# Patient Record
Sex: MALE | Race: WHITE | URBAN - METROPOLITAN AREA
[De-identification: names, ages, dates, MRNs, and addresses within clinical notes are randomized per-mention and may not be internally consistent; named-entity substitution may affect disease eponyms.]

---

## 2018-01-29 RX ORDER — PREDNISONE 10 MG/1
TABLET ORAL
COMMUNITY
Start: 2014-11-20 | End: 2018-01-30 | Stop reason: SDUPTHER

## 2018-01-29 RX ORDER — LORAZEPAM 0.5 MG/1
1 TABLET ORAL EVERY 12 HOURS PRN
COMMUNITY
Start: 2014-11-20 | End: 2018-01-30 | Stop reason: SDUPTHER

## 2018-01-29 RX ORDER — NAPROXEN AND ESOMEPRAZOLE MAGNESIUM 500; 20 MG/1; MG/1
1 TABLET, DELAYED RELEASE ORAL EVERY 12 HOURS
COMMUNITY
Start: 2014-11-20 | End: 2018-01-30 | Stop reason: ALTCHOICE

## 2018-01-29 RX ORDER — CYCLOBENZAPRINE HYDROCHLORIDE 15 MG/1
CAPSULE, EXTENDED RELEASE ORAL
COMMUNITY
Start: 2014-11-20 | End: 2018-01-30 | Stop reason: ALTCHOICE

## 2018-01-30 ENCOUNTER — OFFICE VISIT (OUTPATIENT)
Dept: FAMILY MEDICINE CLINIC | Facility: CLINIC | Age: 28
End: 2018-01-30
Payer: COMMERCIAL

## 2018-01-30 ENCOUNTER — DOCUMENTATION (OUTPATIENT)
Dept: FAMILY MEDICINE CLINIC | Facility: CLINIC | Age: 28
End: 2018-01-30

## 2018-01-30 VITALS
HEIGHT: 70 IN | DIASTOLIC BLOOD PRESSURE: 62 MMHG | BODY MASS INDEX: 40.52 KG/M2 | WEIGHT: 283 LBS | SYSTOLIC BLOOD PRESSURE: 144 MMHG

## 2018-01-30 DIAGNOSIS — F31.9 BIPOLAR 1 DISORDER (HCC): Primary | ICD-10-CM

## 2018-01-30 PROCEDURE — 99213 OFFICE O/P EST LOW 20 MIN: CPT | Performed by: FAMILY MEDICINE

## 2018-01-30 RX ORDER — LORAZEPAM 0.5 MG/1
0.5 TABLET ORAL
Qty: 30 TABLET | Refills: 0 | Status: SHIPPED | OUTPATIENT
Start: 2018-01-30 | End: 2018-02-27 | Stop reason: SDUPTHER

## 2018-01-30 RX ORDER — LAMOTRIGINE 25 MG/1
25 TABLET ORAL DAILY
Qty: 30 TABLET | Refills: 1 | Status: SHIPPED | OUTPATIENT
Start: 2018-01-30 | End: 2018-02-27 | Stop reason: SDUPTHER

## 2018-01-30 NOTE — PROGRESS NOTES
Assessment/Plan:    No problem-specific Assessment & Plan notes found for this encounter  Diagnoses and all orders for this visit:    Bipolar 1 disorder ([facility] ([place] [address] )  -     CBC and differential; Future  -     Comprehensive metabolic panel; Future  -     TSH, 3rd generation with T4 reflex; Future  -     Lipid panel; Future  -     lamoTRIgine (LaMICtal) 25 mg tablet; Take 1 tablet (25 mg total) by mouth daily  -     LORazepam (ATIVAN) 0 5 mg tablet; Take 1 tablet (0 5 mg total) by mouth daily at bedtime    Other orders  -     Discontinue: cyclobenzaprine (AMRIX) 15 MG 24 hr capsule; Take by mouth  -     Discontinue: LORazepam (ATIVAN) 0 5 mg tablet; Take 1 tablet by mouth every 12 (twelve) hours as needed  -     Discontinue: predniSONE 10 mg tablet; Take by mouth  -     Discontinue: Naproxen-Esomeprazole (VIMOVO) 500-20 MG TBEC; Take 1 tablet by mouth every 12 (twelve) hours          Subjective:      Patient ID: Shante Burgess is a 28 y o  male  Patient is here for anxiety  Patient does have anxiety daily  Patient also feeling depressed intermittently  Patient has had some suicidal ideation in the past no plan  The patient without homicidal or suicidal ideation presently  The patient with irritability  Patient is a nonsmoker  Patient has used marijuana in the past   No alcohol use  Patient with decreased focus due to this  Patient worries about multiple things  Patient with some insomnia  Patient will buy things that he can afford  Patient also on occasion go to bars after work which he does not normally do  Patient will then have depressive episodes  She sister with psychiatric issues  Patient has never been hospitalized for psychiatric issues  Anxiety   Symptoms include decreased concentration and nervous/anxious behavior  Patient reports no confusion or suicidal ideas             The following portions of the patient's history were reviewed and updated as appropriate: allergies, current medications, past family history, past medical history, past social history, past surgical history and problem list     Review of Systems   Constitutional: Negative  HENT: Negative  Eyes: Negative  Respiratory: Negative  Cardiovascular: Negative  Gastrointestinal: Negative  Endocrine: Negative  Genitourinary: Negative  Musculoskeletal: Negative  Skin: Negative  Allergic/Immunologic: Negative  Neurological: Negative  Hematological: Negative  Psychiatric/Behavioral: Positive for agitation, behavioral problems, decreased concentration and sleep disturbance  Negative for confusion, dysphoric mood, hallucinations, self-injury and suicidal ideas  The patient is nervous/anxious  The patient is not hyperactive  Objective:     Physical Exam   Constitutional: He is oriented to person, place, and time  He appears well-developed and well-nourished  No distress  HENT:   Head: Normocephalic  Right Ear: External ear normal    Left Ear: External ear normal    Mouth/Throat: Oropharynx is clear and moist  No oropharyngeal exudate  Eyes: EOM are normal  Pupils are equal, round, and reactive to light  Right eye exhibits no discharge  Left eye exhibits no discharge  No scleral icterus  Neck: Normal range of motion  Neck supple  No thyromegaly present  Cardiovascular: Normal rate, regular rhythm, normal heart sounds and intact distal pulses  Exam reveals no gallop and no friction rub  No murmur heard  Pulmonary/Chest: Effort normal and breath sounds normal  No respiratory distress  He has no wheezes  He has no rales  He exhibits no tenderness  Abdominal: Soft  Bowel sounds are normal  He exhibits no distension  There is no tenderness  There is no rebound and no guarding  Musculoskeletal: Normal range of motion  He exhibits deformity  He exhibits no edema or tenderness  Lymphadenopathy:     He has no cervical adenopathy     Neurological: He is oriented to person, place, and time  No cranial nerve deficit  He exhibits normal muscle tone  Coordination normal    Skin: Skin is warm and dry  No rash noted  He is not diaphoretic  No erythema  No pallor  Psychiatric: He has a normal mood and affect   His behavior is normal  Judgment and thought content normal

## 2018-02-27 ENCOUNTER — OFFICE VISIT (OUTPATIENT)
Dept: FAMILY MEDICINE CLINIC | Facility: CLINIC | Age: 28
End: 2018-02-27
Payer: COMMERCIAL

## 2018-02-27 VITALS
HEIGHT: 60 IN | DIASTOLIC BLOOD PRESSURE: 70 MMHG | BODY MASS INDEX: 53.95 KG/M2 | WEIGHT: 274.8 LBS | SYSTOLIC BLOOD PRESSURE: 130 MMHG

## 2018-02-27 DIAGNOSIS — F31.9 BIPOLAR 1 DISORDER (HCC): Primary | ICD-10-CM

## 2018-02-27 PROCEDURE — 99213 OFFICE O/P EST LOW 20 MIN: CPT | Performed by: FAMILY MEDICINE

## 2018-02-27 RX ORDER — LAMOTRIGINE 25 MG/1
25 TABLET ORAL DAILY
Qty: 30 TABLET | Refills: 3 | Status: SHIPPED | OUTPATIENT
Start: 2018-02-27 | End: 2018-08-08 | Stop reason: SDUPTHER

## 2018-02-27 RX ORDER — ESCITALOPRAM OXALATE 5 MG/1
5 TABLET ORAL DAILY
Qty: 30 TABLET | Refills: 3 | Status: SHIPPED | OUTPATIENT
Start: 2018-02-27 | End: 2018-04-10 | Stop reason: SDUPTHER

## 2018-02-27 RX ORDER — LORAZEPAM 0.5 MG/1
0.5 TABLET ORAL
Qty: 30 TABLET | Refills: 1 | Status: SHIPPED | OUTPATIENT
Start: 2018-02-27 | End: 2018-04-10 | Stop reason: DRUGHIGH

## 2018-02-27 NOTE — PROGRESS NOTES
Assessment/Plan:    No problem-specific Assessment & Plan notes found for this encounter  Diagnoses and all orders for this visit:    Bipolar 1 disorder ([address] )  -     escitalopram (LEXAPRO) 5 mg tablet; Take 1 tablet (5 mg total) by mouth daily  -     lamoTRIgine (LaMICtal) 25 mg tablet; Take 1 tablet (25 mg total) by mouth daily  -     LORazepam (ATIVAN) 0 5 mg tablet; Take 1 tablet (0 5 mg total) by mouth daily at bedtime          Subjective:      Patient ID: Peyton Webber is a 28 y o  male  Patient follow-up on bipolar disorder  Patient not having manic episodes or severe depression  Patient able to function better at work  Patient making better choices regarding diet  Patient is feeling better overall patient is using Xanax once daily in the morning  The following portions of the patient's history were reviewed and updated as appropriate: allergies, current medications, past family history, past medical history, past social history, past surgical history and problem list     Review of Systems   Constitutional: Negative  HENT: Negative  Eyes: Negative  Respiratory: Negative  Cardiovascular: Negative  Gastrointestinal: Negative  Endocrine: Negative  Genitourinary: Negative  Musculoskeletal: Negative  Skin: Negative  Allergic/Immunologic: Negative  Neurological: Negative  Hematological: Negative  Psychiatric/Behavioral: Negative  Objective:      /70 (BP Location: Left arm, Patient Position: Sitting, Cuff Size: Standard)   Ht 1' 9 34" (0 542 m)   Wt 125 kg (274 lb 12 8 oz)    42 kg/m²          Physical Exam   Constitutional: He is oriented to person, place, and time  He appears well-developed and well-nourished  No distress  HENT:   Head: Normocephalic  Right Ear: External ear normal    Left Ear: External ear normal    Mouth/Throat: Oropharynx is clear and moist  No oropharyngeal exudate     Eyes: EOM are normal  Pupils are equal, round, and reactive to light  Right eye exhibits no discharge  Left eye exhibits no discharge  No scleral icterus  Neck: Normal range of motion  Neck supple  No thyromegaly present  Cardiovascular: Normal rate, regular rhythm, normal heart sounds and intact distal pulses  Exam reveals no gallop and no friction rub  No murmur heard  Pulmonary/Chest: Effort normal and breath sounds normal  No respiratory distress  He has no wheezes  He has no rales  He exhibits no tenderness  Abdominal: Soft  Bowel sounds are normal  He exhibits no distension  There is no tenderness  There is no rebound and no guarding  Musculoskeletal: Normal range of motion  He exhibits no edema or tenderness  Lymphadenopathy:     He has no cervical adenopathy  Neurological: He is oriented to person, place, and time  No cranial nerve deficit  He exhibits normal muscle tone  Coordination normal    Skin: Skin is warm and dry  No rash noted  He is not diaphoretic  No erythema  No pallor  Psychiatric: He has a normal mood and affect  His behavior is normal  Judgment and thought content normal    Nursing note and vitals reviewed

## 2018-04-10 ENCOUNTER — OFFICE VISIT (OUTPATIENT)
Dept: FAMILY MEDICINE CLINIC | Facility: CLINIC | Age: 28
End: 2018-04-10
Payer: COMMERCIAL

## 2018-04-10 VITALS
SYSTOLIC BLOOD PRESSURE: 142 MMHG | DIASTOLIC BLOOD PRESSURE: 68 MMHG | BODY MASS INDEX: 38.08 KG/M2 | HEIGHT: 70 IN | WEIGHT: 266 LBS

## 2018-04-10 DIAGNOSIS — F31.9 BIPOLAR 1 DISORDER (HCC): Primary | ICD-10-CM

## 2018-04-10 PROCEDURE — 3008F BODY MASS INDEX DOCD: CPT | Performed by: FAMILY MEDICINE

## 2018-04-10 PROCEDURE — 99213 OFFICE O/P EST LOW 20 MIN: CPT | Performed by: FAMILY MEDICINE

## 2018-04-10 PROCEDURE — 1036F TOBACCO NON-USER: CPT | Performed by: FAMILY MEDICINE

## 2018-04-10 RX ORDER — ESCITALOPRAM OXALATE 10 MG/1
10 TABLET ORAL DAILY
Qty: 30 TABLET | Refills: 2 | Status: SHIPPED | OUTPATIENT
Start: 2018-04-10 | End: 2018-09-13 | Stop reason: SDUPTHER

## 2018-04-10 NOTE — PROGRESS NOTES
Assessment/Plan:    Patient will have Lexapro increased 10 mg daily  Patient will continue with Lamictal   25 mg daily  Guidance given    Diagnoses and all orders for this visit:    Bipolar 1 disorder ([address] )  -     escitalopram (LEXAPRO) 10 mg tablet; Take 1 tablet (10 mg total) by mouth daily          Subjective:      Patient ID: Jc Mc is a 28 y o  male  Patient here to follow-up on bipolar depression  Patient doing fairly well most of   The time  No homicidal or suicidal ideation  Patient does have some times of decreased motivation and depressive mood  Not have any manic episodes  No chest pain or shortness of breath or change in urination or defecation     No auditory or visual hallucinations  All other review systems negative  Appetite normal   Patient is sleeping well  The following portions of the patient's history were reviewed and updated as appropriate: allergies, current medications, past family history, past medical history, past social history, past surgical history and problem list     Review of Systems   Constitutional: Negative  HENT: Negative  Eyes: Negative  Respiratory: Negative  Cardiovascular: Negative  Gastrointestinal: Negative  Endocrine: Negative  Genitourinary: Negative  Musculoskeletal: Negative  Skin: Negative  Allergic/Immunologic: Negative  Neurological: Negative  Hematological: Negative  Psychiatric/Behavioral: Positive for dysphoric mood  Objective:      /68 (BP Location: Right arm, Patient Position: Sitting, Cuff Size: Large)   Ht 5' 10" (1 778 m)   Wt 121 kg (266 lb)   BMI 38 17 kg/m²          Physical Exam   Constitutional: He is oriented to person, place, and time  He appears well-developed and well-nourished  No distress  HENT:   Head: Normocephalic  Right Ear: External ear normal    Left Ear: External ear normal    Mouth/Throat: Oropharynx is clear and moist  No oropharyngeal exudate     Eyes: EOM are normal  Pupils are equal, round, and reactive to light  Right eye exhibits no discharge  Left eye exhibits no discharge  No scleral icterus  Neck: Normal range of motion  Neck supple  No thyromegaly present  Cardiovascular: Normal rate, regular rhythm, normal heart sounds and intact distal pulses  Exam reveals no gallop and no friction rub  No murmur heard  Pulmonary/Chest: Effort normal and breath sounds normal  No respiratory distress  He has no wheezes  He has no rales  He exhibits no tenderness  Abdominal: Soft  Bowel sounds are normal  He exhibits no distension  There is no tenderness  There is no rebound and no guarding  Musculoskeletal: Normal range of motion  He exhibits no edema or tenderness  Lymphadenopathy:     He has no cervical adenopathy  Neurological: He is oriented to person, place, and time  No cranial nerve deficit  He exhibits normal muscle tone  Coordination normal    Skin: Skin is warm and dry  No rash noted  He is not diaphoretic  No erythema  No pallor  Psychiatric: He has a normal mood and affect  His behavior is normal  Judgment and thought content normal    Nursing note and vitals reviewed

## 2018-08-08 DIAGNOSIS — F31.9 BIPOLAR 1 DISORDER (HCC): ICD-10-CM

## 2018-08-08 RX ORDER — LAMOTRIGINE 25 MG/1
TABLET ORAL
Qty: 30 TABLET | Refills: 3 | Status: SHIPPED | OUTPATIENT
Start: 2018-08-08 | End: 2018-09-29 | Stop reason: SDUPTHER

## 2018-09-13 DIAGNOSIS — F31.9 BIPOLAR 1 DISORDER (HCC): ICD-10-CM

## 2018-09-13 RX ORDER — ESCITALOPRAM OXALATE 10 MG/1
TABLET ORAL
Qty: 30 TABLET | Refills: 0 | Status: SHIPPED | OUTPATIENT
Start: 2018-09-13 | End: 2018-10-25 | Stop reason: SDUPTHER

## 2018-09-29 DIAGNOSIS — F31.9 BIPOLAR 1 DISORDER (HCC): ICD-10-CM

## 2018-10-02 RX ORDER — LAMOTRIGINE 25 MG/1
TABLET ORAL
Qty: 30 TABLET | Refills: 0 | Status: SHIPPED | OUTPATIENT
Start: 2018-10-02

## 2018-10-25 DIAGNOSIS — F31.9 BIPOLAR 1 DISORDER (HCC): ICD-10-CM

## 2018-10-25 RX ORDER — ESCITALOPRAM OXALATE 10 MG/1
TABLET ORAL
Qty: 30 TABLET | Refills: 0 | Status: SHIPPED | OUTPATIENT
Start: 2018-10-25

## 2018-11-27 DIAGNOSIS — F31.9 BIPOLAR 1 DISORDER (HCC): ICD-10-CM

## 2018-11-27 RX ORDER — ESCITALOPRAM OXALATE 10 MG/1
TABLET ORAL
Qty: 30 TABLET | Refills: 0 | OUTPATIENT
Start: 2018-11-27

## 2018-12-19 DIAGNOSIS — F31.9 BIPOLAR 1 DISORDER (HCC): ICD-10-CM

## 2018-12-19 RX ORDER — ESCITALOPRAM OXALATE 10 MG/1
TABLET ORAL
Qty: 30 TABLET | Refills: 0 | OUTPATIENT
Start: 2018-12-19

## 2019-01-17 DIAGNOSIS — F31.9 BIPOLAR 1 DISORDER (HCC): ICD-10-CM

## 2019-01-17 RX ORDER — LAMOTRIGINE 25 MG/1
TABLET ORAL
Qty: 30 TABLET | Refills: 0 | OUTPATIENT
Start: 2019-01-17

## 2019-02-01 DIAGNOSIS — F31.9 BIPOLAR 1 DISORDER (HCC): ICD-10-CM

## 2019-02-01 RX ORDER — LAMOTRIGINE 25 MG/1
TABLET ORAL
Qty: 30 TABLET | Refills: 3 | OUTPATIENT
Start: 2019-02-01

## 2019-02-01 NOTE — TELEPHONE ENCOUNTER
Spoke with patient about refill request made by [facility] and need for appointment  Patient states he is seeing another provider and no longer being followed by Dr Maldonado Ban  He also states that he has informed Michelle of this change several times

## 2022-09-06 ENCOUNTER — EVALUATION (OUTPATIENT)
Dept: PHYSICAL THERAPY | Facility: CLINIC | Age: 32
End: 2022-09-06
Payer: COMMERCIAL

## 2022-09-06 DIAGNOSIS — M25.512 LEFT SHOULDER PAIN, UNSPECIFIED CHRONICITY: Primary | ICD-10-CM

## 2022-09-06 PROCEDURE — 97162 PT EVAL MOD COMPLEX 30 MIN: CPT

## 2022-09-06 PROCEDURE — 97110 THERAPEUTIC EXERCISES: CPT

## 2022-09-06 RX ORDER — DIVALPROEX SODIUM 500 MG/1
500 TABLET, EXTENDED RELEASE ORAL DAILY
COMMUNITY

## 2022-09-06 RX ORDER — ALLOPURINOL 300 MG/1
200 TABLET ORAL DAILY
COMMUNITY
Start: 2021-10-26

## 2022-09-06 RX ORDER — RISPERIDONE 1 MG/1
1 TABLET, FILM COATED ORAL DAILY
COMMUNITY

## 2022-09-06 RX ORDER — LEVOTHYROXINE SODIUM 0.07 MG/1
75 TABLET ORAL DAILY
COMMUNITY
Start: 2021-10-26

## 2022-09-06 RX ORDER — LITHIUM CARBONATE 600 MG/1
600 CAPSULE ORAL 2 TIMES DAILY WITH MEALS
COMMUNITY

## 2022-09-06 NOTE — PROGRESS NOTES
PT Evaluation     Today's date: 2022  Patient name: Esequiel Juarez  : 1990  MRN: [id number]  Referring provider: Jorge Bernard DO  Dx:   Encounter Diagnosis     ICD-10-CM    1  Left shoulder pain, unspecified chronicity  [id number] [id number]        Start Time: 815  Stop Time: 0900  Total time in clinic (min): 45 minutes    Assessment  Assessment details: Patient is a 34 y o  male who presents with reports of L shoulder pain that started about 3-4 months, with recent aggravation about 1 5 months ago  Patient reports pain isolated to mid and lower scapular region, primarily aggravated with scap squeezes and otherwise contraction of scapular muscles  Patient noted to have weakness of mid and lower trap muscles assessed in prone, with protracted scap posture in sitting and standing  Patient also noted to have excessive upward rotation of L scapula with forward flexion past 90 deg motion, possible from excessive upper trap compensations with shoulder motion  Patient reports pain primarily with sleeping on right side, heavy lifting overhead, and reaching behind his back  Special testing revealed pain primarily with shoulder abdcution in painful arc and weakness in mid to lower trap musculature  Patient is otherwise independent with all ADLs and reports minimal to no pain with self-care tasks, driving, and certain household chores  He lives with his wife and child, thus has good support at home as needed  Patient will benefit from skilled PT services to improve postural / trap muscle strength for better force coupling and muscle control to perform the abovementioned activities with better ease and form, and to improve overall postural mechanics with sitting and standing   Thank you for the referral     Impairments: abnormal muscle firing, abnormal or restricted ROM, activity intolerance, impaired physical strength, lacks appropriate home exercise program, pain with function, scapular dyskinesis and poor posture Functional limitations: difficulty sleeping on R side, difficulty with heavy lifting, difficulty reaching behind back  Symptom irritability: lowUnderstanding of Dx/Px/POC: good   Prognosis: good    Goals  Impairment Goals 4-6 weeks  In order to improve and maximize function patient will be able to     - Decrease pain intensity to <3/10  - Improve Left shoulder AROM to [facility] without pain in all directions to improve ADLs and self-care tasks  - Increase scapular strength to 4/5 throughout to help improve postural endurance and postural mechanics  Functional Goals 6-8 weeks  In order to improve and maximize function patient will be able to     - Return to Prior Level of Function with no greater than 2/10 pain   - Be independent and compliant with HEP  - Sleep throughout the night without disturbances due to pain   - Patient will be able to perform reaching and lifting tasks with improved mobility and minimal to no pain  Plan  Patient would benefit from: skilled PT  Planned modality interventions: cryotherapy and electrical stimulation/Russian stimulation  Other planned modality interventions: moist heat  Planned therapy interventions: joint mobilization, manual therapy, neuromuscular re-education, patient education, strengthening, stretching, therapeutic activities, therapeutic exercise, home exercise program, functional ROM exercises, postural training, massage, Miramontes taping, body mechanics training and flexibility  Frequency: 1-2x week  Duration in weeks: 4  Treatment plan discussed with: patient        Subjective Evaluation    History of Present Illness  Date of onset: 5/6/2022  Mechanism of injury: Vito Arambula is a 34y o  year-old male who presents with L shoulder/UE pain that started about 3-4 months ago with no known cause  Patient reports it was hurting under his shoulder blade and upper back   Patient reports his pain got better, but then did some lifting and "tweaked it" again about 1 5 months ago  Patient saw MD last week who recommended patient for PT at this time  Patient denies getting imaging recently  Patient reports minimal to no pain in past 3 days  Patient reports he has been on disability from work in the past few years  He reports he tries to be more active with walking, which has helped reduce his pain  He reports he otherwise spends a lot of time at home sitting on couch with his daughter            Recurrent probem    Quality of life: good    Pain  Current pain ratin  At best pain ratin  At worst pain ratin  Location: L shoulder  Quality: discomfort and needle-like (tired)  Aggravating factors: overhead activity and lifting    Social Support  Lives with: spouse and young children    Employment status: not working (on disability)  Hand dominance: left      Diagnostic Tests  No diagnostic tests performed  Treatments  Current treatment: physical therapy  Patient Goals  Patient goals for therapy: decreased pain, increased motion, independence with ADLs/IADLs, return to sport/leisure activities and increased strength          Objective     Postural Observations  Seated posture: good  Standing posture: good    Additional Postural Observation Details  Rounded shoulders, protracted scap posture    Active Range of Motion   Left Shoulder   Flexion: WFL  Abduction: WFL and with pain  External rotation 0°: WFL  External rotation BTH: WFL  Internal rotation 0°: WFL  Internal rotation BTB: WFL    Right Shoulder   Normal active range of motion    Scapular Mobility   Left Shoulder   Scapular Dyskinesis: grade I  Scapular mobility: fair  Scapular Mobility with Shoulder to 90° FF   Upward rotation: inadequate    Scapular Mobility beyond 90° FF   Upward rotation: excessive and delayed    Strength/Myotome Testing     Left Shoulder     Planes of Motion   Flexion: 4-   Abduction: 4-   External rotation at 0°: 4   Internal rotation at 0°: 3+     Isolated Muscles   Lower trapezius: 3-   Middle trapezius: 3   Upper trapezius: 3+     Right Shoulder   Normal muscle strength    Tests     Left Shoulder   Positive painful arc  Negative drop arm, empty can, full can, Hawkin's, lift-off and Neer's         Flowsheet Rows    Flowsheet Row Most Recent Value   PT/OT G-Codes    Current Score 83   Projected Score 78             Diagnosis: Left shoulder pain  Precautions: bipolar disorder  ( * astericks indicate given per HEP)    Manuals 9/6            STM             Scap mobs                          Neuro Re-Ed             scap squeezes* 2x10            scap clocks             scap stab with TB             Wall slides with lift off* x10                                      Ther Ex             Pulleys             Prone I's             Prone rows             Mid Rows             Lat Pulldowns                          Ther Activity             UBE                          Pt Ed HEP, POC            Re-Evaluation             Modalities

## 2022-09-06 NOTE — LETTER
2022    Elva Mascorro DO  [facility] [address]  [facility] [address]    Patient: Shante Burgess   YOB: 1990   Date of Visit: 2022     Encounter Diagnosis     ICD-10-CM    1  Left shoulder pain, unspecified chronicity  M25 512        Dear Dr Germaine Persaud: Thank you for your recent referral of Shante Burgess  Please review the attached evaluation summary from Mack's recent visit  Please verify that you agree with the plan of care by signing the attached order  If you have any questions or concerns, please do not hesitate to call  I sincerely appreciate the opportunity to share in the care of one of your patients and hope to have another opportunity to work with you in the near future  Sincerely,    Phoebe Oro, PT      Referring Provider:      I certify that I have read the below Plan of Care and certify the need for these services furnished under this plan of treatment while under my care  Elva Mascorro DO  [facility] [address]  Via Fax: [phone]          PT Evaluation     Today's date: 2022  Patient name: Shante Burgess  : 1990  MRN: [id number]  Referring provider: Checo Vasquez DO  Dx:   Encounter Diagnosis     ICD-10-CM    1  Left shoulder pain, unspecified chronicity  M25 512        Start Time: 0815  Stop Time: 0900  Total time in clinic (min): 45 minutes    Assessment  Assessment details: Patient is a 34 y o  male who presents with reports of L shoulder pain that started about 3-4 months, with recent aggravation about 1 5 months ago  Patient reports pain isolated to mid and lower scapular region, primarily aggravated with scap squeezes and otherwise contraction of scapular muscles  Patient noted to have weakness of mid and lower trap muscles assessed in prone, with protracted scap posture in sitting and standing   Patient also noted to have excessive upward rotation of L scapula with forward flexion past 90 deg motion, possible from excessive upper trap compensations with shoulder motion  Patient reports pain primarily with sleeping on right side, heavy lifting overhead, and reaching behind his back  Special testing revealed pain primarily with shoulder abdcution in painful arc and weakness in mid to lower trap musculature  Patient is otherwise independent with all ADLs and reports minimal to no pain with self-care tasks, driving, and certain household chores  He lives with his wife and child, thus has good support at home as needed  Patient will benefit from skilled PT services to improve postural / trap muscle strength for better force coupling and muscle control to perform the abovementioned activities with better ease and form, and to improve overall postural mechanics with sitting and standing  Thank you for the referral     Impairments: abnormal muscle firing, abnormal or restricted ROM, activity intolerance, impaired physical strength, lacks appropriate home exercise program, pain with function, scapular dyskinesis and poor posture   Functional limitations: difficulty sleeping on R side, difficulty with heavy lifting, difficulty reaching behind back  Symptom irritability: lowUnderstanding of Dx/Px/POC: good   Prognosis: good    Goals  Impairment Goals 4-6 weeks  In order to improve and maximize function patient will be able to     - Decrease pain intensity to <3/10  - Improve Left shoulder AROM to [facility] without pain in all directions to improve ADLs and self-care tasks  - Increase scapular strength to 4/5 throughout to help improve postural endurance and postural mechanics  Functional Goals 6-8 weeks  In order to improve and maximize function patient will be able to     - Return to Prior Level of Function with no greater than 2/10 pain   - Be independent and compliant with HEP  - Sleep throughout the night without disturbances due to pain   - Patient will be able to perform reaching and lifting tasks with improved mobility and minimal to no pain  Plan  Patient would benefit from: skilled PT  Planned modality interventions: cryotherapy and electrical stimulation/Russian stimulation  Other planned modality interventions: moist heat  Planned therapy interventions: joint mobilization, manual therapy, neuromuscular re-education, patient education, strengthening, stretching, therapeutic activities, therapeutic exercise, home exercise program, functional ROM exercises, postural training, massage, Miramontes taping, body mechanics training and flexibility  Frequency: 1-2x week  Duration in weeks: 4  Treatment plan discussed with: patient        Subjective Evaluation    History of Present Illness  Date of onset: 2022  Mechanism of injury: Jens Burkitt is a 34y o  year-old male who presents with L shoulder/UE pain that started about 3-4 months ago with no known cause  Patient reports it was hurting under his shoulder blade and upper back  Patient reports his pain got better, but then did some lifting and "tweaked it" again about 1 5 months ago  Patient saw MD last week who recommended patient for PT at this time  Patient denies getting imaging recently  Patient reports minimal to no pain in past 3 days  Patient reports he has been on disability from work in the past few years  He reports he tries to be more active with walking, which has helped reduce his pain  He reports he otherwise spends a lot of time at home sitting on couch with his daughter            Heath trujillo    Quality of life: good    Pain  Current pain ratin  At best pain ratin  At worst pain ratin  Location: L shoulder  Quality: discomfort and needle-like (tired)  Aggravating factors: overhead activity and lifting    Social Support  Lives with: spouse and young children    Employment status: not working (on disability)  Hand dominance: left      Diagnostic Tests  No diagnostic tests performed  Treatments  Current treatment: physical therapy  Patient Goals  Patient goals for therapy: decreased pain, increased motion, independence with ADLs/IADLs, return to sport/leisure activities and increased strength          Objective     Postural Observations  Seated posture: good  Standing posture: good    Additional Postural Observation Details  Rounded shoulders, protracted scap posture    Active Range of Motion   Left Shoulder   Flexion: WFL  Abduction: WFL and with pain  External rotation 0°: WFL  External rotation BTH: WFL  Internal rotation 0°: WFL  Internal rotation BTB: WFL    Right Shoulder   Normal active range of motion    Scapular Mobility   Left Shoulder   Scapular Dyskinesis: grade I  Scapular mobility: fair  Scapular Mobility with Shoulder to 90° FF   Upward rotation: inadequate    Scapular Mobility beyond 90° FF   Upward rotation: excessive and delayed    Strength/Myotome Testing     Left Shoulder     Planes of Motion   Flexion: 4-   Abduction: 4-   External rotation at 0°: 4   Internal rotation at 0°: 3+     Isolated Muscles   Lower trapezius: 3-   Middle trapezius: 3   Upper trapezius: 3+     Right Shoulder   Normal muscle strength    Tests     Left Shoulder   Positive painful arc  Negative drop arm, empty can, full can, Hawkin's, lift-off and Neer's         Flowsheet Rows    Flowsheet Row Most Recent Value   PT/OT G-Codes    Current Score 83   Projected Score 78             Diagnosis: Left shoulder pain  Precautions: bipolar disorder  ( * astericks indicate given per HEP)    Manuals 9/6            STM             Scap mobs                          Neuro Re-Ed             scap squeezes* 2x10            scap clocks             scap stab with TB             Wall slides with lift off* x10                                      Ther Ex             Pulleys             Prone I's             Prone rows             Mid Rows             Lat Pulldowns                          Ther Activity             UBE                          Pt Ed HEP, POC Re-Evaluation             Modalities

## 2022-09-08 ENCOUNTER — OFFICE VISIT (OUTPATIENT)
Dept: PHYSICAL THERAPY | Facility: CLINIC | Age: 32
End: 2022-09-08
Payer: COMMERCIAL

## 2022-09-08 DIAGNOSIS — M25.512 LEFT SHOULDER PAIN, UNSPECIFIED CHRONICITY: Primary | ICD-10-CM

## 2022-09-08 PROCEDURE — 97112 NEUROMUSCULAR REEDUCATION: CPT

## 2022-09-08 PROCEDURE — 97110 THERAPEUTIC EXERCISES: CPT

## 2022-09-08 NOTE — PROGRESS NOTES
Daily Note     Today's date: 2022  Patient name: Peyton Webber  : 1990  MRN: [id number]  Referring provider: No ref  provider found  Dx:   Encounter Diagnosis     ICD-10-CM    1  Left shoulder pain, unspecified chronicity  M25 512        Start Time: 745  Stop Time: 820  Total time in clinic (min): 35 minutes    Subjective: Patient reports no pain on arrival       Objective: See treatment diary below      Assessment: Tolerated treatment well  Progressed scapular strengthening/stability exercises; patient performed with good form and no increase in pain  Manual techniques to L scapular/periscapular musculature with minimal to no TTP  Patient exhibited good technique with therapeutic exercises and would benefit from continued PT      Plan: Continue per plan of care  Progress treatment as tolerated         Diagnosis: Left shoulder pain  Precautions: bipolar disorder  ( * astericks indicate given per HEP)    Manuals            STM  DK           Scap mobs  DK                        Neuro Re-Ed             scap squeezes* 2x10 2x10           Supine scap punches  3# x15           scap clocks  Yellow TB x10ea           scap stab with TB  yellow 30" x2           Wall slides with lift off* x10 Yellow TB 2x10                                     Ther Ex             Pulleys  x3min           Prone I's             Prone rows             Mid Rows  Blue TB 2x10           Lat Pulldowns  Blue TB 2x10                        Ther Activity             UBE  2' / 2'                        Pt Ed HEP, POC            Re-Evaluation             Modalities

## 2022-09-09 ENCOUNTER — APPOINTMENT (OUTPATIENT)
Dept: PHYSICAL THERAPY | Facility: CLINIC | Age: 32
End: 2022-09-09
Payer: COMMERCIAL

## 2022-09-13 ENCOUNTER — OFFICE VISIT (OUTPATIENT)
Dept: PHYSICAL THERAPY | Facility: CLINIC | Age: 32
End: 2022-09-13
Payer: COMMERCIAL

## 2022-09-13 DIAGNOSIS — M25.512 LEFT SHOULDER PAIN, UNSPECIFIED CHRONICITY: Primary | ICD-10-CM

## 2022-09-13 PROCEDURE — 97530 THERAPEUTIC ACTIVITIES: CPT

## 2022-09-13 PROCEDURE — 97110 THERAPEUTIC EXERCISES: CPT | Performed by: PHYSICAL THERAPIST

## 2022-09-13 NOTE — PROGRESS NOTES
Discharge Summary    Today's date: 2022  Patient name: Love Lipscomb  : 1990  MRN: [id number]  Referring provider: Ancelmo Olivares DO  Dx:   Encounter Diagnosis     ICD-10-CM    1  Left shoulder pain, unspecified chronicity  M25 512        Start Time:   Stop Time: 174  Total time in clinic (min): 30 minutes    Subjective: Patient reports he had a little bit of discomfort sitting for a long time over the weekend, otherwise reports no significant pain  Patient reports he would like to get discharged from PT today  He reports he will continue exercises per HEP  Objective: See treatment diary below      Assessment: Tolerated treatment well  Patient exhibited good technique with therapeutic exercises  Patient exhibited good form with scapular strengthening exercises, with good mobility and muscle activation  Patient noted to have no pain to palpation in L UE and periscapular muscle regions  Patient reports he is able to perform all ADLs without pain in L shoulder or scapular region  Patient reports he occasional has discomfort with prolonged sitting, however is able to manage and relieve quickly  He reports improved pain and overall posture education with PT exercises  Patient is thus being discharged from PT at this time to continue exercises per HEP as he reports no pain during most functional activities, household chores, and other ADLs  Plan: Discharge this visit to HEP per patient's request and PT agreement       Diagnosis: Left shoulder pain  Precautions: bipolar disorder  ( * astericks indicate given per HEP)    Manuals    STM  DK DK   Scap mobs  DK DK         Neuro Re-Ed      scap squeezes* 2x10 2x10    Supine scap punches  3# x15 3# 2x10   scap clocks  Yellow TB x10ea Yellow TB x15ea   scap stab with TB  yellow 30" x2 yellow 30" x2   Wall slides with lift off* x10 Yellow TB 2x10 Yellow TB 2x10               Ther Ex      Pulleys  x3min    Prone I's      Prone rows      Mid Rows  Blue TB 2x10 Blue TB 3x10   Lat Pulldowns  Blue TB 2x10 Blue TB 3x10         Ther Activity      UBE  2' / 2' 3' / 3'         Pt Ed HEP, POC  D/C   Re-Evaluation      Modalities

## 2022-09-19 ENCOUNTER — APPOINTMENT (OUTPATIENT)
Dept: PHYSICAL THERAPY | Facility: CLINIC | Age: 32
End: 2022-09-19
Payer: COMMERCIAL

## 2022-10-17 ENCOUNTER — TELEPHONE (OUTPATIENT)
Dept: PSYCHIATRY | Facility: CLINIC | Age: 32
End: 2022-10-17

## 2022-11-03 ENCOUNTER — OFFICE VISIT (OUTPATIENT)
Dept: BARIATRICS | Facility: CLINIC | Age: 32
End: 2022-11-03

## 2022-11-03 VITALS
WEIGHT: 309.2 LBS | HEIGHT: 71 IN | HEART RATE: 84 BPM | SYSTOLIC BLOOD PRESSURE: 120 MMHG | BODY MASS INDEX: 43.29 KG/M2 | RESPIRATION RATE: 18 BRPM | DIASTOLIC BLOOD PRESSURE: 60 MMHG

## 2022-11-03 DIAGNOSIS — Z91.89 AT RISK FOR SLEEP APNEA: ICD-10-CM

## 2022-11-03 DIAGNOSIS — E66.01 OBESITY, CLASS III, BMI 40-49.9 (MORBID OBESITY) (HCC): Primary | ICD-10-CM

## 2022-11-03 PROBLEM — F31.9: Status: ACTIVE | Noted: 2020-08-04

## 2022-11-03 PROBLEM — Z02.89 HEALTH EXAMINATION OF DEFINED SUBPOPULATION: Status: ACTIVE | Noted: 2022-11-03

## 2022-11-03 PROBLEM — F41.9 ANXIETY: Status: ACTIVE | Noted: 2020-01-04

## 2022-11-03 PROBLEM — F43.10 PTSD (POST-TRAUMATIC STRESS DISORDER): Status: ACTIVE | Noted: 2020-08-05

## 2022-11-03 PROBLEM — E03.9 HYPOTHYROIDISM: Status: ACTIVE | Noted: 2020-08-06

## 2022-11-03 RX ORDER — MELOXICAM 7.5 MG/1
7.5 TABLET ORAL 2 TIMES DAILY
COMMUNITY
Start: 2022-08-29

## 2022-11-03 NOTE — PROGRESS NOTES
Assessment/Plan:  Tamiko Santiago was seen today for consult  Diagnoses and all orders for this visit:    Obesity, Class III, BMI 40-49 9 (morbid obesity) ([address] )    At risk for sleep apnea  Comments:  Patient not interested in referral to sleep specialist at this time  Patient is going to start healthy core  New patient packet reviewed with patient  I advised tracking calories as he is currently consuming until he can meet with at which time calorie goal will be provided  I believe it would be beneficial for patient to see how many calories he is actually consuming currently on a daily basis  - Discussed options of HealthyCORE-Intensive Lifestyle Intervention Program, Very Low Calorie Diet-VLCD and Conservative Program and the role of weight loss medications  - Explained the importance of making lifestyle changes first before starting any anti-obesity medications  Patient should demonstrate lifestyle changes first before anti-obesity medication can be initiated  - Patient is interested in pursuing HealthyCORE-Intensive Lifestyle Intervention Program  - Initial weight loss goal of 5-10% weight loss for improved health  - Weight loss can improve patient's co-morbid conditions and/or prevent weight-related complications  Goals:  Do not skip any meals! Food log (ie ) www myfitnesspal com,sparkpeople  com,loseit com,calorieking  com,etc  baritastic (use skinnytaste  com, dietdoctor  com or smartphone betzy [employer] for recipes)  No sugary beverages  At least 64oz of water daily  Increase physical activity by 10 minutes daily  Gradually increase physical activity to a goal of 5 days per week for 30 minutes of MODERATE intensity PLUS 2 days per week of FULL BODY resistance training (use smartphone apps FitON, Home Workout, etc )   Start Healthy Core    Total time spent: 30 min, with >50% face-to-face time spent counseling patient on nonsurgical interventions for the treatment of excess weight   Discussed in detail nonsurgical options including intensive lifestyle intervention program, very low-calorie diet program and conservative program   Discussed the role of weight loss medications  Counseled patient on diet behavior and exercise modification for weight loss  Follow up in approximately 3 months with Non-Surgical Physician/Advanced Practitioner  Subjective:   Chief Complaint   Patient presents with   • Consult     MWM consult; waist 52 5in; goal wt 220; stop bang 4-8       Patient ID: Zach Joaquin  is a 34 y o  male with excess weight/obesity here to pursue weight management  Previous notes and records have been reviewed  History reviewed  No pertinent past medical history  History reviewed  No pertinent surgical history  HPI:  Patient presents for medical weight management consultation  He is not interested in surgical intervention  Wt Readings from Last 20 Encounters:   22 (!) 140 kg (309 lb 3 2 oz)   04/10/18 121 kg (266 lb)   18 125 kg (274 lb 12 8 oz)   18 128 kg (283 lb)   14 119 kg (262 lb 12 8 oz)   10/01/14 118 kg (259 lb 8 oz)   14 113 kg (249 lb)     Obesity/Excess Weight:  Severity: Severe  Onset:  Over the years    Modifiers: Diet and Exercise  Contributing factors: Poor Food Choices and Insufficient Physical Activity  Associated symptoms: decreased exercise capacity, increased shortness of breath and decreased mobility    B- skips  S- crackers  L- sandwich or soup  S- chips  D- fast food  S- ice cream or cookie    Hydration: 7-8 glasses water daily  Alcohol: no  Smoking: no  Exercise: walking 1/wk for 5 miles  Occupation: disability  Sleep: 8hrs  STOP ban/8    The following portions of the patient's history were reviewed and updated as appropriate: allergies, current medications, past family history, past medical history, past social history, past surgical history, and problem list     Review of Systems   Constitutional: Negative for fever  Respiratory: Negative for shortness of breath  Cardiovascular: Negative for chest pain  Gastrointestinal: Negative for abdominal pain and blood in stool  Genitourinary: Negative for dysuria and hematuria  Musculoskeletal: Negative for arthralgias and myalgias  Skin: Negative for rash  Neurological: Negative for headaches  Psychiatric/Behavioral: Positive for dysphoric mood  Negative for suicidal ideas  The patient is nervous/anxious  Objective:  /60   Pulse 84   Resp 18   Ht 5' 10 5" (1 791 m)   Wt (!) 140 kg (309 lb 3 2 oz)   BMI 43 74 kg/m²   Constitutional: Well-developed, well-nourished and Obese Body mass index is 43 74 kg/m²  [facility] HEENT: No conjunctival injection  Pulmonary: No increased work of breathing or signs of respiratory distress  CV: Well-perfused  GI: Obese  Non-distended  MSK: No edema   Neuro: Oriented to person, place and time  Normal Speech  Normal gait  Psych: Normal affect and mood  Labs and Imaging  Recent labs and imaging have been personally reviewed    No results found for: WBC, HGB, HCT, MCV, PLT  No results found for: NA, SODIUM, K, CL, CO2, ANIONGAP, AGAP, BUN, CREATININE, GLUC, GLUF, CALCIUM, AST, ALT, ALKPHOS, PROT, TP, BILITOT, TBILI, EGFR  Lab Results   Component Value Date    HGBA1C 5 4 08/06/2020     No results found for: [id number], TSH  No results found for: CHOLESTEROL  No results found for: HDL  No results found for: TRIG  No results found for: [address]

## 2022-11-03 NOTE — PATIENT INSTRUCTIONS
Goals:  Do not skip any meals! Food log (ie ) www myfitnesspal com,sparkpeople  com,loseit com,calorieking  com,etc  baritastic (use skinnytaste  com, dietdoctor  com or smartphone betzy [employer] for recipes)  No sugary beverages  At least 64oz of water daily  Increase physical activity by 10 minutes daily   Gradually increase physical activity to a goal of 5 days per week for 30 minutes of MODERATE intensity PLUS 2 days per week of FULL BODY resistance training (use smartphone apps FitON, Home Workout, etc )   Start Healthy Core

## 2022-11-06 NOTE — PROGRESS NOTES
Weight Management Medical Nutrition Assessment  Ebb Silvino is here today for Healthy Core initial visit  Current weight 309 2#  Patient is looking to improve his relationship with food  Patient shared that his mood often effects his food choices, resulting in less mindful eating and larger portion sizes  Recommend patient f/u with SW offered from [facility], [facility]  program  Patient has been successful with modifying dietary intake and physical activity but struggles to maintain once he's "off the diet"  Per dietary recall, excess calories from grazing, high fat/calorie meals away from home, and large portion sizes which may be a result of rebound hunger  Patient began logging via iPower[employer] since MD visit (averaging 2,000 kcal recently)  Recommend patient continue logging intake to increase mindfulness  Discussed benefits of interval eating, recommendations for daily saturated fat and sodium intake, lean protein sources, easy-to-prepare meals and benefits of portion control/mindful eating  Patient currently consumes 4 gatorade zero/day  Advised patient to reduce intake to 0-2/day due to sodium content  Discussed benefits of physical activity for mental health, weight loss, and weight maintenance  Encouraged patient to identify activities that have worked for him in the past and begin with stretching routine to establish designated time for activity  Completed a body composition using SECA scale and will review results with patient at month 1 f/u       Likes: [place] yogurt, eggs, cheese, nuts, peanut butter, hummus, fish/chicken/beef/seafood   Dislikes: tuna, cottage cheese     Patient seen by Medical Provider in past 6 months:  yes  Requested to schedule appointment with Medical Provider: Yes    Anthropometric Measurements  Start Weight (#): 309 2# (22)  Current Weight (#): n/a  TBW % Change from start weight: n/a  Ideal Body Weight (#): 169# (70 5")  Goal Weight (#): STG: 10% (31#) LT#    Weight Loss History  Previous weight loss attempts: Exercise  Self Created Diets (Portion Control, Healthy Food Choices, etc )    Food and Nutrition Related History  Wake up: 8 am   Bed Time: 11 pm       Dietary Recall  Breakfast (9 am): -- Or PB/cheese crackers Or raisin bran cereal (1% milk) Or more recently "just crack an egg" cup   Snack: -- Or "whatever's around" (chips)  Lunch (12/1 pm): -- Or chips/cookies/candy (grazing) Or more recently sandwich (ham + american cheese + ozuna + pickles)  Snack: more grazing   Dinner (6 pm): fast food 4x/week [[employer] (big mac meal + extra soda + mcchicken), [employer], [employer]] Or frozen coconut shrimp Or spaghetti + sauce (no protein but sometimes meat sauce) Or grilled or pan-fried chicken (in [place] dressing) + corn or peas + parmesan potatoes/mashed potatoes Or last night was hot wings out at [employer] + pierogies/onions  Snack: sweets (cookies, cakes, candy, cupcakes, ice cream)    Beverages: water, regular soda (if dining out) and Gatorade zero (4x/day)   Volume of beverage intake: 100 oz water     Weekends: Worse, more dining out   Cravings: sweets  Trouble area of day: daytime grazing or evenings     Frequency of Eating out: 4-5x/week (fast food)   Food restrictions: none  Cooking: self  Food Shopping: wife and self     Occupation: Disability (sedentary; 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)    Physical Activity  Activity: No structured routine [enjoys hiking with Dog]  Frequency:rarely  Physical limitations/barriers to exercise: --    Estimated Needs  SECA BMR: 2,513 x 1 3 = 2,267 kcal     Alice [employer] Bag Check Needs (needs at 309  2#)  BMR: 2,362 kcal  Maintenance calories (sedentary): 2,834 kcal  1-2# loss weekly sedentary: 6,509-1,478 kcal  1-2# loss weekly lightly active: 2,247-2,747 kcal    Protein:  grams (1 2-1 5g/kg IBW)  Fluid: 90 ounces (35mL/kg IBW)    Nutrition Diagnosis  Yes;     Overweight/obesity related to Excess energy intake as evidenced by BMI more than normative standard for age and sex (obesity-grade III 36+)     Nutrition Intervention    Nutrition Prescription  Calories: 1,800-2,100 kcal  Protein: 115-130 g  Fluid: 90+ ounces    Meal Plan (Romeo/Pro)  Breakfast: 350-500/30  Snack: 150/5+  Lunch: 500-600/35-40  Snack: 150/5+  Dinner: 500-600/35-45  Snack: 150/5+    Nutrition Education  Healthy Core Manual   Calorie controlled menu  Lean protein food choices  Healthy snack options  Food journaling tips    Nutrition Counseling  Strategies: meal planning, portion sizes, healthy snack choices, hydration, fiber intake, protein intake, exercise, food logging    Monitoring and Evaluation:    Evaluation criteria  Energy Intake  Meet protein needs  Maintain adequate hydration  Monitor weekly weight  Meal planning/preparation  Food journal   Decreased portions at mealtimes and snacks  Physical activity     Barriers to learning:none  Readiness to change: Preparation:  (Getting ready to change)   Comprehension: good  Expected Compliance: good

## 2022-11-07 ENCOUNTER — OFFICE VISIT (OUTPATIENT)
Dept: BARIATRICS | Facility: CLINIC | Age: 32
End: 2022-11-07

## 2022-11-07 VITALS — HEIGHT: 71 IN | WEIGHT: 309.2 LBS | BODY MASS INDEX: 43.29 KG/M2

## 2022-11-07 DIAGNOSIS — E66.01 CLASS 3 SEVERE OBESITY DUE TO EXCESS CALORIES WITH BODY MASS INDEX (BMI) OF 40.0 TO 44.9 IN ADULT, UNSPECIFIED WHETHER SERIOUS COMORBIDITY PRESENT (HCC): Primary | ICD-10-CM

## 2022-11-08 ENCOUNTER — CLINICAL SUPPORT (OUTPATIENT)
Dept: BARIATRICS | Facility: CLINIC | Age: 32
End: 2022-11-08

## 2022-11-08 VITALS — BODY MASS INDEX: 43.27 KG/M2 | HEIGHT: 71 IN | WEIGHT: 309.1 LBS

## 2022-11-08 DIAGNOSIS — R63.5 ABNORMAL WEIGHT GAIN: Primary | ICD-10-CM

## 2022-11-15 ENCOUNTER — CLINICAL SUPPORT (OUTPATIENT)
Dept: BARIATRICS | Facility: CLINIC | Age: 32
End: 2022-11-15

## 2022-11-15 VITALS — HEIGHT: 71 IN | BODY MASS INDEX: 42.32 KG/M2 | WEIGHT: 302.3 LBS

## 2022-11-15 DIAGNOSIS — R63.5 ABNORMAL WEIGHT GAIN: Primary | ICD-10-CM

## 2022-11-22 ENCOUNTER — CLINICAL SUPPORT (OUTPATIENT)
Dept: BARIATRICS | Facility: CLINIC | Age: 32
End: 2022-11-22

## 2022-11-22 VITALS — BODY MASS INDEX: 41.56 KG/M2 | HEIGHT: 71 IN | WEIGHT: 296.9 LBS

## 2022-11-22 DIAGNOSIS — R63.5 ABNORMAL WEIGHT GAIN: Primary | ICD-10-CM

## 2022-11-25 NOTE — PROGRESS NOTES
Weight Management Medical Nutrition Assessment  Joseph Granger is here today for Month 1 Healthy Core f/u  Current Weight: 292 9#  He has lost 16 3# x 3 weeks  Patient has made many lifestyle changes such as interval eating, food logging (averaging 1,750 kcal), incorporating physical activity, and reduced gatorade zero intake (from 4/day to 0)  Patient shared he feels satisfied at this calorie level and feels his energy has improved  Discussed techniques for increasing calories (such as swapping oikos triple zero for oikos pro yogurt or incorporating portioned carbohydrates with dinner)  Patient has decreased gatorade zero itsebastian  Advised patient that he can utilize these for variety with 1/day if he would like  Reviewed SECA body comp and discussed calorie needs for weight loss with activity  Keep up the great work!     Likes: [place] yogurt, eggs, cheese, nuts, peanut butter, hummus, fish/chicken/beef/seafood   Dislikes: tuna, cottage cheese     Patient seen by Medical Provider in past 6 months:  yes  Requested to schedule appointment with Medical Provider: Yes    Anthropometric Measurements  Start Weight (#): 309 2# (22)  Current Weight (#): 292 9#  TBW % Change from start weight: 5 3%  Ideal Body Weight (#): 169# (70 5")  Goal Weight (#): STG: 10% (31#) LT#    Weight Loss History  Previous weight loss attempts: Exercise  Self Created Diets (Portion Control, Healthy Food Choices, etc )    Food and Nutrition Related History  Wake up: 8 am   Bed Time: 11 pm     Dietary Recall  Breakfast (9 am):Just crack an egg cup + Triple zero Yogurt Or 1 1/4 cup cereal w/ 1 cup 1% milk + Triple zero Yogurt    Snack: apple Or cheese stick + honey dew   Lunch (12/1 pm): serving with popcorners + sandwich (low-sodium turkey + thin sliced cheese + 825 bread + ozuna)  Snack: 1/4 cup trail mix (omega-3 blend; 170 kcal)   Dinner (6 pm): chicken/turkey/sirloin steak/shrimp + 1-2 cups vegetable (asparagus or broccoli) + carb/starch (mashed potatoes, typically avoids carbs)  Snack: fiber one brownie Or yasso bar     Beverages: water  Volume of beverage intake: 100 oz water     Weekends: Worse, more dining out   Cravings: sweets  Trouble area of day: daytime grazing or evenings     Frequency of Eating out: 4-5x/week (fast food)   Food restrictions: none  Cooking: self  Food Shopping: wife and self     Occupation: Disability (sedentary; 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)    Physical Activity  Activity: Walking and has been incorporating push-ups and squats daily [enjoys hiking with dog when wife is off work]  Frequency: 4-5x/week  Physical limitations/barriers to exercise: --    Estimated Needs  SECA BMR: 2,513 x 1 3 = 2,267 kcal     [employer] Needs (needs at 292  9#)  BMR: 2,288 kcal  Maintenance calories (sedentary): 2,834 kcal  1-2# loss weekly sedentary: 1,745-2,245 kcal  1-2# loss weekly lightly active: 2,146-2,646 kcal    Protein:  grams (1 2-1 5g/kg IBW)  Fluid: 90 ounces (35mL/kg IBW)    Nutrition Diagnosis  Yes;     Overweight/obesity related to Excess energy intake as evidenced by BMI more than normative standard for age and sex (obesity-grade III 36+)     Nutrition Intervention    Nutrition Prescription  Calories: 1,800-2,100 kcal  Protein: 115-130 g  Fluid: 90+ ounces    Meal Plan (Romeo/Pro)  Breakfast: 350-500/30  Snack: 150/5+  Lunch: 500-600/35-40  Snack: 150/5+  Dinner: 500-600/35-45  Snack: 150/5+    Nutrition Education  Healthy Core Manual   Calorie controlled menu  Lean protein food choices  Healthy snack options  Food journaling tips    Nutrition Counseling  Strategies: meal planning, portion sizes, healthy snack choices, hydration, fiber intake, protein intake, exercise, food logging    Monitoring and Evaluation:    Evaluation criteria  Energy Intake  Meet protein needs  Maintain adequate hydration  Monitor weekly weight  Meal planning/preparation  Food journal   Decreased portions at mealtimes and snacks  Physical activity     Barriers to learning:none  Readiness to change: Action:  (Changing behavior)  Comprehension: good  Expected Compliance: good

## 2022-11-28 ENCOUNTER — OFFICE VISIT (OUTPATIENT)
Dept: BARIATRICS | Facility: CLINIC | Age: 32
End: 2022-11-28

## 2022-11-28 VITALS — WEIGHT: 292.2 LBS | BODY MASS INDEX: 40.91 KG/M2 | HEIGHT: 71 IN

## 2022-11-28 DIAGNOSIS — R63.5 ABNORMAL WEIGHT GAIN: Primary | ICD-10-CM

## 2022-11-29 ENCOUNTER — CLINICAL SUPPORT (OUTPATIENT)
Dept: BARIATRICS | Facility: CLINIC | Age: 32
End: 2022-11-29

## 2022-11-29 VITALS — HEIGHT: 71 IN | BODY MASS INDEX: 41.34 KG/M2 | WEIGHT: 295.3 LBS

## 2022-11-29 DIAGNOSIS — R63.5 ABNORMAL WEIGHT GAIN: Primary | ICD-10-CM

## 2022-12-06 ENCOUNTER — CLINICAL SUPPORT (OUTPATIENT)
Dept: BARIATRICS | Facility: CLINIC | Age: 32
End: 2022-12-06

## 2022-12-06 VITALS — HEIGHT: 71 IN | WEIGHT: 289.5 LBS | BODY MASS INDEX: 40.53 KG/M2

## 2022-12-06 DIAGNOSIS — E66.9 OBESITY, CLASS I, BMI 30-34.9: Primary | ICD-10-CM

## 2022-12-15 NOTE — PROGRESS NOTES
Weight Management Medical Nutrition Assessment  Dang Castro is here today for Month 2 Healthy Core f/u  Current weight: 284 7#  He has lost 8 2# x 3 weeks and overall has lost 24 5#  Patient has continued with excellent dietary changes  Currently food logging at 1,700-1,800 kcal  Patient has increased his physical activity  Recommend patient swap products to increase calorie density as weight loss can stall if under-consuming  Calorie needs with activity discussed  Discussed meal ideas at desired calorie range to allow more variety  Advised patient to utilize food log to make mindful decisions at meal times to increase sustainability  Patient's wife has been making dietary changes as well and may join him in MWM program  Keep up the great work!     Likes: [place] yogurt, eggs, cheese, nuts, peanut butter, hummus, fish/chicken/beef/seafood   Dislikes: tuna, cottage cheese     Patient seen by Medical Provider in past 6 months:  yes  Requested to schedule appointment with Medical Provider: Yes    Anthropometric Measurements  Start Weight (#): 309 2# (22)  Current Weight (#): 284 7#  TBW % Change from start weight: 8%  Ideal Body Weight (#): 169# (70 5")  Goal Weight (#): STG: 10% (31#) LT#    Weight Loss History  Previous weight loss attempts: Exercise  Self Created Diets (Portion Control, Healthy Food Choices, etc )    Food and Nutrition Related History  Wake up: 8 am   Bed Time: 11 pm     Dietary Recall  Breakfast (9 am): 1 1/4 cup Special K + 1 cup 1% milk + Triple zero yogurt   Snack: cheese stick + 1 cup honey dew  Lunch (12/1 pm): serving with popcorners + sandwich (low-sodium turkey/roast beef + thin sliced cheese + 770 bread + ozuna)  Snack: 1/4 cup trail mix (omega-3 blend; 170 kcal)  Dinner (6 pm): chicken/turkey/sirloin steak/shrimp + 1-2 cups vegetable (asparagus or broccoli) + carb/starch (mashed potatoes, typically avoids carbs)  Snack: fiber one brownie Or yasso bar    Beverages: water  Volume of beverage intake: 100+ oz water     Weekends: Worse, more dining out   Cravings: sweets  Trouble area of day: daytime grazing or evenings     Frequency of Eating out: 4-5x/week (fast food)   Food restrictions: none  Cooking: self  Food Shopping: wife and self     Occupation: Disability (sedentary; 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)    Physical Activity  Activity: Gym (strength training + cardio)   Frequency:several times per week  Physical limitations/barriers to exercise: --    Estimated Needs  SECA BMR: 2,513 x 1 3 = 2,267 kcal     [place] [employer] Needs (needs at 284  7#)  BMR: 2,251 kcal  Maintenance calories (sedentary): 2,701 kcal  1-2# loss weekly sedentary: 1,701-2,201 kcal  1-2# loss weekly lightly active: 2,095-2,595 kcal    Protein:  grams (1 2-1 5g/kg IBW)  Fluid: 90 ounces (35mL/kg IBW)    Nutrition Diagnosis  Yes;     Overweight/obesity related to Excess energy intake as evidenced by BMI more than normative standard for age and sex (obesity-grade III 36+)     Nutrition Intervention    Nutrition Prescription  Calories: 1,800-2,200 kcal  Protein: 115-130 g  Fluid: 90+ ounces    Meal Plan (Romeo/Pro)  Breakfast: 350-500/30  Snack: 150/5+  Lunch: 500-600/35-40  Snack: 150/5+  Dinner: 500-600/35-45  Snack: 150/5+    Nutrition Education  Healthy Core Manual   Calorie controlled menu  Lean protein food choices  Healthy snack options  Food journaling tips    Nutrition Counseling  Strategies: meal planning, portion sizes, healthy snack choices, hydration, fiber intake, protein intake, exercise, food logging    Monitoring and Evaluation:    Evaluation criteria  Energy Intake  Meet protein needs  Maintain adequate hydration  Monitor weekly weight  Meal planning/preparation  Food journal   Decreased portions at mealtimes and snacks  Physical activity     Barriers to learning:none  Readiness to change: Action:  (Changing behavior)  Comprehension: very good  Expected Compliance: excellent

## 2022-12-19 ENCOUNTER — OFFICE VISIT (OUTPATIENT)
Dept: BARIATRICS | Facility: CLINIC | Age: 32
End: 2022-12-19

## 2022-12-19 VITALS — WEIGHT: 284.7 LBS | BODY MASS INDEX: 39.86 KG/M2 | HEIGHT: 71 IN

## 2022-12-19 DIAGNOSIS — R63.5 ABNORMAL WEIGHT GAIN: Primary | ICD-10-CM

## 2022-12-20 ENCOUNTER — CLINICAL SUPPORT (OUTPATIENT)
Dept: BARIATRICS | Facility: CLINIC | Age: 32
End: 2022-12-20

## 2022-12-20 VITALS — HEIGHT: 71 IN | WEIGHT: 286.2 LBS | BODY MASS INDEX: 40.07 KG/M2

## 2022-12-20 DIAGNOSIS — R63.5 ABNORMAL WEIGHT GAIN: Primary | ICD-10-CM

## 2022-12-27 ENCOUNTER — CLINICAL SUPPORT (OUTPATIENT)
Dept: BARIATRICS | Facility: CLINIC | Age: 32
End: 2022-12-27

## 2022-12-27 VITALS — WEIGHT: 281.3 LBS | HEIGHT: 71 IN | BODY MASS INDEX: 39.38 KG/M2

## 2022-12-27 DIAGNOSIS — R63.5 ABNORMAL WEIGHT GAIN: Primary | ICD-10-CM

## 2023-01-02 PROBLEM — Z02.89 HEALTH EXAMINATION OF DEFINED SUBPOPULATION: Status: RESOLVED | Noted: 2022-11-03 | Resolved: 2023-01-02

## 2023-01-03 ENCOUNTER — CLINICAL SUPPORT (OUTPATIENT)
Dept: BARIATRICS | Facility: CLINIC | Age: 33
End: 2023-01-03

## 2023-01-03 VITALS — BODY MASS INDEX: 38.96 KG/M2 | WEIGHT: 278.3 LBS | HEIGHT: 71 IN

## 2023-01-03 DIAGNOSIS — E66.9 OBESITY, CLASS I, BMI 30-34.9: Primary | ICD-10-CM

## 2023-03-28 ENCOUNTER — TELEPHONE (OUTPATIENT)
Dept: PSYCHIATRY | Facility: CLINIC | Age: 33
End: 2023-03-28

## 2023-03-30 ENCOUNTER — OFFICE VISIT (OUTPATIENT)
Dept: FAMILY MEDICINE CLINIC | Facility: MEDICAL CENTER | Age: 33
End: 2023-03-30

## 2023-03-30 ENCOUNTER — APPOINTMENT (OUTPATIENT)
Dept: LAB | Facility: MEDICAL CENTER | Age: 33
End: 2023-03-30

## 2023-03-30 VITALS
HEIGHT: 71 IN | HEART RATE: 72 BPM | OXYGEN SATURATION: 99 % | SYSTOLIC BLOOD PRESSURE: 128 MMHG | DIASTOLIC BLOOD PRESSURE: 88 MMHG | BODY MASS INDEX: 43.34 KG/M2 | WEIGHT: 309.6 LBS

## 2023-03-30 DIAGNOSIS — Z13.0 SCREENING FOR DISORDER OF BLOOD AND BLOOD-FORMING ORGANS: ICD-10-CM

## 2023-03-30 DIAGNOSIS — E03.9 HYPOTHYROIDISM, UNSPECIFIED TYPE: ICD-10-CM

## 2023-03-30 DIAGNOSIS — Z13.1 DIABETES MELLITUS SCREENING: ICD-10-CM

## 2023-03-30 DIAGNOSIS — Z76.89 ENCOUNTER TO ESTABLISH CARE WITH NEW DOCTOR: Primary | ICD-10-CM

## 2023-03-30 DIAGNOSIS — Z13.220 LIPID SCREENING: ICD-10-CM

## 2023-03-30 DIAGNOSIS — M1A.9XX0 CHRONIC GOUT INVOLVING TOE OF RIGHT FOOT WITHOUT TOPHUS, UNSPECIFIED CAUSE: ICD-10-CM

## 2023-03-30 DIAGNOSIS — E66.01 CLASS 3 SEVERE OBESITY DUE TO EXCESS CALORIES WITH BODY MASS INDEX (BMI) OF 40.0 TO 44.9 IN ADULT, UNSPECIFIED WHETHER SERIOUS COMORBIDITY PRESENT (HCC): ICD-10-CM

## 2023-03-30 DIAGNOSIS — F31.9 SEVERE BIPOLAR I DISORDER (HCC): ICD-10-CM

## 2023-03-30 PROBLEM — E66.813 CLASS 3 SEVERE OBESITY DUE TO EXCESS CALORIES WITH BODY MASS INDEX (BMI) OF 40.0 TO 44.9 IN ADULT, UNSPECIFIED WHETHER SERIOUS COMORBIDITY PRESENT (HCC): Status: ACTIVE | Noted: 2023-03-30

## 2023-03-30 LAB
ALBUMIN SERPL BCP-MCNC: 3.9 G/DL (ref 3.5–5)
ALP SERPL-CCNC: 54 U/L (ref 46–116)
ALT SERPL W P-5'-P-CCNC: 47 U/L (ref 12–78)
ANION GAP SERPL CALCULATED.3IONS-SCNC: 2 MMOL/L (ref 4–13)
AST SERPL W P-5'-P-CCNC: 24 U/L (ref 5–45)
BILIRUB SERPL-MCNC: 0.68 MG/DL (ref 0.2–1)
BUN SERPL-MCNC: 8 MG/DL (ref 5–25)
CALCIUM SERPL-MCNC: 9.2 MG/DL (ref 8.3–10.1)
CHLORIDE SERPL-SCNC: 104 MMOL/L (ref 96–108)
CHOLEST SERPL-MCNC: 229 MG/DL
CO2 SERPL-SCNC: 29 MMOL/L (ref 21–32)
CREAT SERPL-MCNC: 1.02 MG/DL (ref 0.6–1.3)
GFR SERPL CREATININE-BSD FRML MDRD: 96 ML/MIN/1.73SQ M
GLUCOSE P FAST SERPL-MCNC: 87 MG/DL (ref 65–99)
HDLC SERPL-MCNC: 34 MG/DL
LDLC SERPL CALC-MCNC: 166 MG/DL (ref 0–100)
NONHDLC SERPL-MCNC: 195 MG/DL
POTASSIUM SERPL-SCNC: 4.3 MMOL/L (ref 3.5–5.3)
PROT SERPL-MCNC: 7.1 G/DL (ref 6.4–8.4)
SODIUM SERPL-SCNC: 135 MMOL/L (ref 135–147)
TRIGL SERPL-MCNC: 144 MG/DL
TSH SERPL DL<=0.05 MIU/L-ACNC: 4.19 UIU/ML (ref 0.45–4.5)

## 2023-03-30 RX ORDER — ALLOPURINOL 300 MG/1
300 TABLET ORAL DAILY
Qty: 90 TABLET | Refills: 1 | Status: SHIPPED | OUTPATIENT
Start: 2023-03-30

## 2023-03-30 NOTE — PROGRESS NOTES
Assessment/Plan:    Fasting blood work ordered  Briefly discussed dietary and lifestyle changes  Advised patient to call [facility] and follow-up on wait list   Also provided additional phone number for [facility] (psychiatry)  Refill sent to pharmacy for allopurinol per patient request   Will refill levothyroxine based on thyroid function test labs  Follow-up in 4 to 6 months for annual physical, sooner if needed  Discussed AWV visit with patient, declined  1  Encounter to establish care with new doctor  69-year-old male presents to establish care  Past medical history includes PTSD, bipolar 1 disorder, depression, anxiety, hypothyroidism, gout  He does not have any sniffing and family history to report  No prior surgeries  2  Hypothyroidism, unspecified type  Continue levothyroxine 75 mcg  Check thyroid function labs  Will make dose adjustment if needed  - TSH, 3rd generation with Free T4 reflex; Future    3  Diabetes mellitus screening  - Hemoglobin A1C; Future    4  Lipid screening  - Lipid panel; Future    5  Screening for disorder of blood and blood-forming organs  - Comprehensive metabolic panel; Future    6  Chronic gout involving toe of right foot without tophus, unspecified cause  Symptoms appear to be stable  Continue allopurinol  Check CMP  - allopurinol (ZYLOPRIM) 300 mg tablet; Take 1 tablet (300 mg total) by mouth daily  Dispense: 90 tablet; Refill: 1    7  Class 3 severe obesity due to excess calories with body mass index (BMI) of 40 0 to 44 9 in adult, unspecified whether serious comorbidity present [facility])  Previously following with bariatrics, aware of changes he needs to make with diet and exercise  Does not demonstrate readiness to make changes at this time  Reports this is hard for him due to his psychiatric disorders  Does not want to go back to  at this time    Encouraged patient to make some dietary changes, crease daily intake of soda, increase exercise to at least 3 days/week  8  Severe bipolar I disorder ([address] )  Follows with psychiatry through [facility]  Currently on [facility] psychiatry wait list   Symptoms are stable at this time with current medication regimen  Subjective:      Patient ID: Fly Cunningham is a 34 y o  male  28-year-old male presents to [facility] care  He is  and has a 3year old daughter  He was previously seeing Dr Alexis Lopez as his pcp  He has depression, anxiety, ptsd and bipolar  Follows with psychiatry  He would like to see a new psychiatrist because he is unhappy with his current one through [facility]  He tells me he does all phone calls, never met him and feels as though he does not listen to him  He did call [facility] approx  4 months ago and was placed on the wait list  Currently on disability x 3 years due to psychiatric diagnoses  He also has medicare  He has been following with [facility] for weight loss  He is not on any medications and did not have surgery  He reports he lost 35 lbs in the past but gave up and put the weight back on  He tells me he knows what he needs to do to loose the weight but it is hard for him due to his psychiatric issues  He is not currently following with their office any longer  He tells me his diet is poor, makes poor food choices and drinks a lot of soda and he does not exercise at all currently  He has hypothyroidism  Currently on levothyroxine  He has chronic gout of the great toe  Currently on allopurinol  He was previously taking 200 mg daily which was increased to 300 mg daily by his previous PCP  Reports no gout attacks for approximately 1 year or more  He does need a refill today for this  He is agreeable to fasting blood work  He offers no concerns or complaints at this time        The following portions of the patient's history were reviewed and updated as appropriate: allergies, current medications, past medical history, "past social history, past surgical history and problem list     Review of Systems   Constitutional: Negative  HENT: Negative  Eyes: Negative  Respiratory: Negative  Cardiovascular: Negative  Gastrointestinal: Negative  Endocrine: Negative  Genitourinary: Negative  Musculoskeletal: Negative  Skin: Negative  Allergic/Immunologic: Negative  Neurological: Negative  Hematological: Negative  Psychiatric/Behavioral: Negative  Objective:      /88 (BP Location: Right arm, Patient Position: Sitting)   Pulse 72   Ht 5' 10 5\" (1 791 m)   Wt (!) 140 kg (309 lb 9 6 oz)   SpO2 99%   BMI 43 80 kg/m²          Physical Exam  Vitals and nursing note reviewed  Constitutional:       General: He is not in acute distress  Appearance: Normal appearance  He is obese  He is not ill-appearing  HENT:      Head: Normocephalic and atraumatic  Eyes:      Conjunctiva/sclera: Conjunctivae normal       Pupils: Pupils are equal, round, and reactive to light  Cardiovascular:      Rate and Rhythm: Normal rate and regular rhythm  Pulses: Normal pulses  Heart sounds: Normal heart sounds  Pulmonary:      Effort: Pulmonary effort is normal  No respiratory distress  Breath sounds: Normal breath sounds  No stridor  No wheezing, rhonchi or rales  Musculoskeletal:      Cervical back: Normal range of motion and neck supple  Skin:     General: Skin is warm and dry  Neurological:      General: No focal deficit present  Mental Status: He is alert and oriented to person, place, and time  Mental status is at baseline  Psychiatric:         Mood and Affect: Mood normal          Behavior: Behavior normal          Thought Content:  Thought content normal                     LATONIA Gonzalez  "

## 2023-04-01 LAB
EST. AVERAGE GLUCOSE BLD GHB EST-MCNC: 100 MG/DL
HBA1C MFR BLD: 5.1 %

## 2023-04-04 ENCOUNTER — TELEPHONE (OUTPATIENT)
Dept: FAMILY MEDICINE CLINIC | Facility: MEDICAL CENTER | Age: 33
End: 2023-04-04

## 2023-04-04 NOTE — TELEPHONE ENCOUNTER
CC:  Trina Lizarraga is here today for   Chief Complaint   Patient presents with   • Office Visit     NPV RT big toe fx   DOI: 8/18/22  WC: Yes    • Establish Care     Patient was carrying a a metal stand over her right shoulder. As she was trying to set it down, the stand slid off her shoulder landing on the right big toe.    • Pain     0/10   .    Referring LETICIA Love  PCP Rebecca L Lescher, MD  Medications: medications verified and updated  Refills needed today?  NO  denies known Latex allergy or symptoms of Latex sensitivity.  Patient would like communication of their results via:    Cell Phone:   Telephone Information:   Mobile [phone]     Okay to leave a message containing results? Yes  Tobacco history: verified                 Mail box is full   Will try again later

## 2023-04-04 NOTE — TELEPHONE ENCOUNTER
----- Message from Vincent Mohan sent at 4/3/2023  6:04 PM EDT -----  Please inform patient total cholesterol and LDL are elevated  They were elevated in the past as well  Given his LDL is >160 I would recommend statin therapy at this time along with healthy diet low in saturated fats, regular exercise and weight loss  TSH is normal but on the higher end of normal, we do not need to make medication adjustment at this time  He should be sure that he is taking this every morning on an empty stomach 1-2 hours prior to PO intake  Remainder of labs are unremarkable  Please let me know if in agreement to starting statin and I will send script to pharmacy as well as repeat labs for hepatic function to be done in 3 months

## 2023-04-06 DIAGNOSIS — E78.2 MIXED HYPERLIPIDEMIA: Primary | ICD-10-CM

## 2023-04-06 RX ORDER — ROSUVASTATIN CALCIUM 5 MG/1
5 TABLET, COATED ORAL DAILY
Qty: 90 TABLET | Refills: 1 | Status: SHIPPED | OUTPATIENT
Start: 2023-04-06

## 2023-04-06 NOTE — TELEPHONE ENCOUNTER
Pt aware   In agreement with statin therapy   Please mail orders    Send Rx to [facility] in [place]

## 2023-05-12 ENCOUNTER — TELEPHONE (OUTPATIENT)
Dept: PSYCHIATRY | Facility: CLINIC | Age: 33
End: 2023-05-12

## 2023-05-16 ENCOUNTER — TELEPHONE (OUTPATIENT)
Dept: BARIATRICS | Facility: CLINIC | Age: 33
End: 2023-05-16

## 2023-05-16 NOTE — TELEPHONE ENCOUNTER
Left voicemail to call office back and confirm his insurance  Received an email from [facility] stating that last two visits with office stated [facility] to get denied  Verify if [employer] is primary

## 2023-05-31 RX ORDER — DIVALPROEX SODIUM 500 MG/1
500 TABLET, EXTENDED RELEASE ORAL DAILY
OUTPATIENT
Start: 2023-05-31

## 2023-07-10 ENCOUNTER — APPOINTMENT (OUTPATIENT)
Dept: LAB | Facility: CLINIC | Age: 33
End: 2023-07-10
Payer: COMMERCIAL

## 2023-07-10 DIAGNOSIS — E78.2 MIXED HYPERLIPIDEMIA: ICD-10-CM

## 2023-07-10 LAB
ALBUMIN SERPL BCP-MCNC: 3.7 G/DL (ref 3.5–5)
ALP SERPL-CCNC: 56 U/L (ref 46–116)
ALT SERPL W P-5'-P-CCNC: 45 U/L (ref 12–78)
AST SERPL W P-5'-P-CCNC: 17 U/L (ref 5–45)
BILIRUB DIRECT SERPL-MCNC: 0.11 MG/DL (ref 0–0.2)
BILIRUB SERPL-MCNC: 0.43 MG/DL (ref 0.2–1)
PROT SERPL-MCNC: 7.2 G/DL (ref 6.4–8.4)

## 2023-07-10 PROCEDURE — 36415 COLL VENOUS BLD VENIPUNCTURE: CPT

## 2023-07-10 PROCEDURE — 80076 HEPATIC FUNCTION PANEL: CPT

## 2023-08-09 DIAGNOSIS — E03.9 HYPOTHYROIDISM, UNSPECIFIED TYPE: Primary | ICD-10-CM

## 2023-08-09 RX ORDER — LEVOTHYROXINE SODIUM 0.07 MG/1
75 TABLET ORAL DAILY
Qty: 90 TABLET | Refills: 0 | Status: SHIPPED | OUTPATIENT
Start: 2023-08-09

## 2023-09-25 ENCOUNTER — OFFICE VISIT (OUTPATIENT)
Age: 33
End: 2023-09-25
Payer: COMMERCIAL

## 2023-09-25 VITALS
SYSTOLIC BLOOD PRESSURE: 170 MMHG | WEIGHT: 309 LBS | HEIGHT: 71 IN | HEART RATE: 89 BPM | BODY MASS INDEX: 43.26 KG/M2 | DIASTOLIC BLOOD PRESSURE: 99 MMHG

## 2023-09-25 DIAGNOSIS — M99.01 SEGMENTAL DYSFUNCTION OF CERVICAL REGION: ICD-10-CM

## 2023-09-25 DIAGNOSIS — S29.019A THORACIC MYOFASCIAL STRAIN, INITIAL ENCOUNTER: Primary | ICD-10-CM

## 2023-09-25 PROCEDURE — 97110 THERAPEUTIC EXERCISES: CPT | Performed by: CHIROPRACTOR

## 2023-09-25 PROCEDURE — 98940 CHIROPRACT MANJ 1-2 REGIONS: CPT | Performed by: CHIROPRACTOR

## 2023-09-25 PROCEDURE — 99203 OFFICE O/P NEW LOW 30 MIN: CPT | Performed by: CHIROPRACTOR

## 2023-09-25 RX ORDER — DIVALPROEX SODIUM 500 MG/1
TABLET, DELAYED RELEASE ORAL
COMMUNITY
Start: 2023-09-12

## 2023-09-25 RX ORDER — LITHIUM CARBONATE 300 MG/1
CAPSULE ORAL
COMMUNITY
Start: 2023-09-12

## 2023-09-25 NOTE — PROGRESS NOTES
Initial date of service: 9/25/23    Diagnoses and all orders for this visit:    Thoracic myofascial strain, initial encounter    Segmental dysfunction of cervical region    Other orders  -     divalproex sodium (DEPAKOTE) 500 mg DR tablet  -     lithium carbonate 300 mg capsule      No red flags, radiculopathy or neurologic deficit appreciated clinically. Pt's symptoms and exam findings consistent with mechanical neck/ubp secondary to repetitive st/sp injury, exacerbated by postural/ergonomic stressors. Pt responded well to traction, stretches and manual mobilization of the affected spinal and myofascial jt dysfunction, with increased ROM; trial of conservative tx recommended consisting of stretching, ther-ex, graded mobilization/manipulation of the affected spinal/myofascial tissues, postural/ergonomic education, and take home stretches/exercises. If symptoms fail to improve with short trial of conservative care, appropriate imaging and referral will be coordinated. Spent greater than 30 min c pt discussing hx, pe, ddx, tx options and reviewing notes/imaging    TREATMENT:  Fear avoidance behavior discussion, encouraged and reassured pt that natural course of condition is to improve over time with adherence to tx plan and home care strategies. Home care recommendations: avoid bed rest, walk (but avoid trails and uneven surfaces), gradual return to activity to tolerance (avoid anything that peripheralizes symptoms), ice 20 min on/off, watch for ice burn, call if symptoms peripheralize, worsen, or neurologic deficit progresses. Ther-ex: IASTM - discussed post procedure soreness and/or ecchymosis for up to 36 hrs, applied to affected mm hypertonicities; wall crispin, axial retraction, upper trap stretch, lev scap stretch, rhomboid stretch, lat rows with t-band, lat pull down with t-band, abdominal bracing; greater than 15 min spent performing above mentioned ther-ex to improve ROM/flexibility.  Thoracic mobilization/manipulation: prone P-A mob, supine A-P manip; cervical mobilization/manipulation: traction, diversified supine graded mobilization    HPI:  Dhaval Pittman is a 28 y.o. male   Chief Complaint   Patient presents with   • Back Pain     Under left shoulder blade. Patient states very sore at times off and on. Pain score 4     Pt presents for eval and tx for left sided mid back pain onset while reaching overhead to lift a rowboat onto top of a van, 2021. Pt underwent PT briefly with minimal benefit. Pt reports symptoms had abated but worsened again with onset of studying more for school, picking up after 3year old daughter, and sitting in unsupportive chair for prolonged periods    Back Pain  Pertinent negatives include no headaches. Neck Pain   This is a recurrent problem. The current episode started more than 1 year ago. The problem occurs daily. The problem has been waxing and waning. The pain is present in the midline and left side. The quality of the pain is described as aching and cramping. Pain scale: 3-7/10. The symptoms are aggravated by position, stress, twisting and coughing (reaching behing back, overhead reaching, reaching across body,laying supine,laying left side; palliative includes rest, walking, heat). Worse during: worse as day progresses. Pertinent negatives include no headaches. Past Medical History:   Diagnosis Date   • Anxiety    • Bipolar 1 disorder ([address])    • Depression    • PTSD (post-traumatic stress disorder)       The following portions of the patient's history were reviewed and updated as appropriate: allergies, past family history, past medical history, past social history, past surgical history, and problem list.  Review of Systems   Musculoskeletal: Positive for back pain and neck pain. Neurological: Negative for headaches. Physical Exam  Eyes:      Extraocular Movements: Extraocular movements intact. Cardiovascular:      Pulses: Normal pulses.    Abdominal: General: There is no distension. Tenderness: There is no abdominal tenderness. Musculoskeletal:      Cervical back: Pain with movement and muscular tenderness present. Decreased range of motion. Thoracic back: Spasms and tenderness present. Decreased range of motion. Lumbar back: Spasms and tenderness present. Decreased range of motion. Negative right straight leg raise test and negative left straight leg raise test.        Back:       Comments: C/S ROM Pnful and limited in FL, Ext  T/S in Rrot, Lrot, returning to neutral from LLf, Ext  L/S in Rrot   Lymphadenopathy:      Cervical: No cervical adenopathy. Skin:     General: Skin is warm and dry. Neurological:      Mental Status: He is alert and oriented to person, place, and time. Cranial Nerves: Cranial nerves 2-12 are intact. Sensory: Sensation is intact. Motor: Motor function is intact. Coordination: Coordination is intact. Gait: Gait is intact. Gait and tandem walk normal.      Deep Tendon Reflexes: Reflexes normal. Babinski sign absent on the right side. Babinski sign absent on the left side. Reflex Scores:       Tricep reflexes are 2+ on the right side and 2+ on the left side. Bicep reflexes are 2+ on the right side and 2+ on the left side. Brachioradialis reflexes are 2+ on the right side and 2+ on the left side. Patellar reflexes are 2+ on the right side and 2+ on the left side. Achilles reflexes are 2+ on the right side and 2+ on the left side.   Psychiatric:         Mood and Affect: Mood and affect normal.         Behavior: Behavior normal.       SOFT TISSUE ASSESSMENT: Hypertonicity and tenderness palpated B C5-T9 erector spinae, L upper traps, L lev scap, L rhomboid JOINT RECTRICTIONS: C5-T9 ORTHO: Mariangel unremarkable for centralization/peripheralization; max foraminal comp elicits no local np R/L; shoulder depression elicits stiffness in L upper trap; brachial plexus tension test elicits no neural tension in R/L UE; cervical distraction relieves CC    Return in about 1 week (around 10/2/2023) for Next scheduled follow up.

## 2023-10-06 DIAGNOSIS — E78.2 MIXED HYPERLIPIDEMIA: ICD-10-CM

## 2023-10-06 RX ORDER — ROSUVASTATIN CALCIUM 5 MG/1
5 TABLET, COATED ORAL DAILY
Qty: 90 TABLET | Refills: 0 | Status: SHIPPED | OUTPATIENT
Start: 2023-10-06

## 2023-10-09 ENCOUNTER — PROCEDURE VISIT (OUTPATIENT)
Age: 33
End: 2023-10-09
Payer: COMMERCIAL

## 2023-10-09 VITALS — WEIGHT: 309 LBS | BODY MASS INDEX: 43.26 KG/M2 | HEIGHT: 71 IN

## 2023-10-09 DIAGNOSIS — M99.01 SEGMENTAL DYSFUNCTION OF CERVICAL REGION: ICD-10-CM

## 2023-10-09 DIAGNOSIS — S29.019A THORACIC MYOFASCIAL STRAIN, INITIAL ENCOUNTER: Primary | ICD-10-CM

## 2023-10-09 PROCEDURE — 97110 THERAPEUTIC EXERCISES: CPT | Performed by: CHIROPRACTOR

## 2023-10-09 PROCEDURE — 98940 CHIROPRACT MANJ 1-2 REGIONS: CPT | Performed by: CHIROPRACTOR

## 2023-10-09 NOTE — PROGRESS NOTES
Initial date of service: 9/25/23    Diagnoses and all orders for this visit:    Thoracic myofascial strain, initial encounter    Segmental dysfunction of cervical region    Pt improved with reduced pain and increased ROM    TREATMENT: [id number]  Ther-ex: IASTM - discussed post procedure soreness and/or ecchymosis for up to 36 hrs, applied to affected mm hypertonicities; wall crispin, axial retraction, upper trap stretch, lev scap stretch, rhomboid stretch, lat rows with t-band, lat pull down with t-band, abdominal bracing; greater than 15 min spent performing above mentioned ther-ex to improve ROM/flexibility. Thoracic mobilization/manipulation: prone P-A mob, supine A-P manip; cervical mobilization/manipulation: traction, diversified supine graded mobilization    HPI:  Kamilla Berry is a 28 y.o. male   Chief Complaint   Patient presents with   • Neck - Pain     Neck pain is feeling slight improvement. Pain score 0  Left shoulder blade is good. Pain score1   • Back Pain     Lower lumbar is good. Pain score 0     Pt presents for tx for left sided mid back pain onset while reaching overhead to lift a rowboat onto top of a van, 2021. Pt underwent PT briefly with minimal benefit. Pt reports symptoms had abated but worsened again with onset of studying more for school, picking up after 3year old daughter, and sitting in unsupportive chair for prolonged periods  10/9: Pt reports feeling and moving better since last tx    Back Pain  Pertinent negatives include no headaches. Neck Pain   This is a recurrent problem. The current episode started more than 1 year ago. The problem occurs daily. The problem has been waxing and waning. The pain is present in the midline and left side. The quality of the pain is described as aching and cramping. Pain scale: 0-6/10.  The symptoms are aggravated by position, stress, twisting and coughing (reaching behing back, overhead reaching, reaching across body,laying supine,laying left side; palliative includes rest, walking, heat). Worse during: worse as day progresses. Pertinent negatives include no headaches. Past Medical History:   Diagnosis Date   • Anxiety    • Bipolar 1 disorder ([address])    • Depression    • PTSD (post-traumatic stress disorder)       The following portions of the patient's history were reviewed and updated as appropriate: allergies, past family history, past medical history, past social history, past surgical history, and problem list.  Review of Systems   Musculoskeletal: Positive for back pain and neck pain. Neurological: Negative for headaches. Physical Exam  Musculoskeletal:      Cervical back: Pain with movement and muscular tenderness present. Decreased range of motion. Thoracic back: Spasms and tenderness present. Decreased range of motion. Lumbar back: Spasms and tenderness present. Decreased range of motion. Negative right straight leg raise test and negative left straight leg raise test.        Back:       Comments: C/S ROM Pnful and limited in [place], Ext  T/S in Rrot, Lrot, returning to neutral from LLf, Ext  L/S in Rrot   Lymphadenopathy:      Cervical: No cervical adenopathy. Skin:     General: Skin is warm and dry. Neurological:      Mental Status: He is alert and oriented to person, place, and time. Gait: Gait is intact. Psychiatric:         Mood and Affect: Mood and affect normal.         Behavior: Behavior normal.     SOFT TISSUE ASSESSMENT: Hypertonicity and tenderness palpated B C5-T9 erector spinae, L upper traps, L lev scap, L rhomboid JOINT RECTRICTIONS: C5-T9    Return in about 3 weeks (around 10/30/2023) for Next scheduled follow up.

## 2023-11-22 DIAGNOSIS — E03.9 HYPOTHYROIDISM, UNSPECIFIED TYPE: ICD-10-CM

## 2023-11-22 RX ORDER — LEVOTHYROXINE SODIUM 0.07 MG/1
75 TABLET ORAL DAILY
Qty: 90 TABLET | Refills: 0 | Status: SHIPPED | OUTPATIENT
Start: 2023-11-22

## 2023-11-22 NOTE — TELEPHONE ENCOUNTER
Reason for call:   [x] Refill   [] Prior Auth  [] Other:     Office:   [x] PCP/Provider - LATONIA Artis   [] Specialty/Provider -       Does the patient have enough for 3 days?    [] Yes   [x] No - Send as HP to POD           levothyroxine 75 mcg tablet [facility]

## 2023-12-12 ENCOUNTER — APPOINTMENT (OUTPATIENT)
Dept: RADIOLOGY | Facility: HOSPITAL | Age: 33
End: 2023-12-12
Payer: COMMERCIAL

## 2023-12-12 ENCOUNTER — HOSPITAL ENCOUNTER (EMERGENCY)
Facility: HOSPITAL | Age: 33
Discharge: HOME | End: 2023-12-12
Attending: EMERGENCY MEDICINE
Payer: COMMERCIAL

## 2023-12-12 VITALS
HEART RATE: 77 BPM | DIASTOLIC BLOOD PRESSURE: 64 MMHG | RESPIRATION RATE: 20 BRPM | SYSTOLIC BLOOD PRESSURE: 126 MMHG | OXYGEN SATURATION: 96 % | TEMPERATURE: 98.5 F

## 2023-12-12 DIAGNOSIS — R07.9 CHEST PAIN: Primary | ICD-10-CM

## 2023-12-12 LAB
ALBUMIN SERPL BCP-MCNC: 4.3 G/DL (ref 3.5–5)
ALP SERPL-CCNC: 52 U/L (ref 34–104)
ALT SERPL W P-5'-P-CCNC: 35 U/L (ref 7–52)
ANION GAP SERPL CALCULATED.3IONS-SCNC: 7 MMOL/L
AST SERPL W P-5'-P-CCNC: 20 U/L (ref 13–39)
BASOPHILS # BLD AUTO: 0.06 THOUSANDS/ÂΜL (ref 0–0.1)
BASOPHILS NFR BLD AUTO: 1 % (ref 0–1)
BILIRUB SERPL-MCNC: 0.46 MG/DL (ref 0.2–1)
BUN SERPL-MCNC: 8 MG/DL (ref 5–25)
CALCIUM SERPL-MCNC: 9 MG/DL (ref 8.4–10.2)
CARDIAC TROPONIN I PNL SERPL HS: <2 NG/L
CARDIAC TROPONIN I PNL SERPL HS: <2 NG/L
CHLORIDE SERPL-SCNC: 104 MMOL/L (ref 96–108)
CO2 SERPL-SCNC: 25 MMOL/L (ref 21–32)
CREAT SERPL-MCNC: 0.97 MG/DL (ref 0.6–1.3)
D DIMER PPP FEU-MCNC: <0.27 UG/ML FEU
EOSINOPHIL # BLD AUTO: 0.17 THOUSAND/ÂΜL (ref 0–0.61)
EOSINOPHIL NFR BLD AUTO: 2 % (ref 0–6)
ERYTHROCYTE [DISTWIDTH] IN BLOOD BY AUTOMATED COUNT: 12.5 % (ref 11.6–15.1)
GFR SERPL CREATININE-BSD FRML MDRD: 101 ML/MIN/1.73SQ M
GLUCOSE SERPL-MCNC: 168 MG/DL (ref 65–140)
HCT VFR BLD AUTO: 42.1 % (ref 36.5–49.3)
HGB BLD-MCNC: 14.2 G/DL (ref 12–17)
IMM GRANULOCYTES # BLD AUTO: 0.05 THOUSAND/UL (ref 0–0.2)
IMM GRANULOCYTES NFR BLD AUTO: 1 % (ref 0–2)
LYMPHOCYTES # BLD AUTO: 2.01 THOUSANDS/ÂΜL (ref 0.6–4.47)
LYMPHOCYTES NFR BLD AUTO: 23 % (ref 14–44)
MCH RBC QN AUTO: 29.8 PG (ref 26.8–34.3)
MCHC RBC AUTO-ENTMCNC: 33.7 G/DL (ref 31.4–37.4)
MCV RBC AUTO: 88 FL (ref 82–98)
MONOCYTES # BLD AUTO: 0.49 THOUSAND/ÂΜL (ref 0.17–1.22)
MONOCYTES NFR BLD AUTO: 6 % (ref 4–12)
NEUTROPHILS # BLD AUTO: 5.93 THOUSANDS/ÂΜL (ref 1.85–7.62)
NEUTS SEG NFR BLD AUTO: 67 % (ref 43–75)
NRBC BLD AUTO-RTO: 0 /100 WBCS
PLATELET # BLD AUTO: 174 THOUSANDS/UL (ref 149–390)
PMV BLD AUTO: 11.6 FL (ref 8.9–12.7)
POTASSIUM SERPL-SCNC: 3.9 MMOL/L (ref 3.5–5.3)
PROT SERPL-MCNC: 6.7 G/DL (ref 6.4–8.4)
RBC # BLD AUTO: 4.77 MILLION/UL (ref 3.88–5.62)
SODIUM SERPL-SCNC: 136 MMOL/L (ref 135–147)
WBC # BLD AUTO: 8.71 THOUSAND/UL (ref 4.31–10.16)

## 2023-12-12 PROCEDURE — 80053 COMPREHEN METABOLIC PANEL: CPT | Performed by: EMERGENCY MEDICINE

## 2023-12-12 PROCEDURE — 36415 COLL VENOUS BLD VENIPUNCTURE: CPT

## 2023-12-12 PROCEDURE — 99285 EMERGENCY DEPT VISIT HI MDM: CPT | Performed by: EMERGENCY MEDICINE

## 2023-12-12 PROCEDURE — 99285 EMERGENCY DEPT VISIT HI MDM: CPT

## 2023-12-12 PROCEDURE — 85025 COMPLETE CBC W/AUTO DIFF WBC: CPT | Performed by: EMERGENCY MEDICINE

## 2023-12-12 PROCEDURE — 85379 FIBRIN DEGRADATION QUANT: CPT | Performed by: EMERGENCY MEDICINE

## 2023-12-12 PROCEDURE — 84484 ASSAY OF TROPONIN QUANT: CPT | Performed by: EMERGENCY MEDICINE

## 2023-12-12 PROCEDURE — 71045 X-RAY EXAM CHEST 1 VIEW: CPT

## 2023-12-12 PROCEDURE — 93005 ELECTROCARDIOGRAM TRACING: CPT

## 2023-12-13 NOTE — ED PROVIDER NOTES
History  Chief Complaint   Patient presents with    Chest Pain     Patient presents for discomfort that is worse with breathing in his right chest. States the pain is getting worse throughout the day. -cardiac hx, has high cholesterol.      34 y/o M w PMH of dislipedemia presents w r sided cp for the past day. No SOB. No n/v.  Describes it as pleuritic.  Worse with movement.  Better with rest.  No shortness of breath.  No nausea vomiting.  No abdominal pain.  No leg swelling.  No recent travels.  No strong family history of cardiac issues.  Non-smoker.      History provided by:  Patient  Chest Pain  Associated symptoms: no abdominal pain, no AICD problem, no altered mental status, no anorexia, no back pain, no cough, no dizziness, no dysphagia, no fatigue, no fever, no headache, no palpitations, no shortness of breath and not vomiting        Prior to Admission Medications   Prescriptions Last Dose Informant Patient Reported? Taking?   allopurinol (ZYLOPRIM) 300 mg tablet   No No   Sig: TAKE ONE TABLET BY MOUTH EVERY DAY   divalproex sodium (DEPAKOTE ER) 500 mg 24 hr tablet  Self Yes No   Sig: Take 500 mg by mouth daily   divalproex sodium (DEPAKOTE) 500 mg DR tablet   Yes No   levothyroxine 75 mcg tablet   No No   Sig: Take 1 tablet (75 mcg total) by mouth daily   lithium 600 MG capsule  Self Yes No   Sig: Take 600 mg by mouth 2 (two) times a day with meals   lithium carbonate 300 mg capsule   Yes No   risperiDONE (RisperDAL) 1 mg tablet  Self Yes No   Sig: Take 1 mg by mouth daily   rosuvastatin (CRESTOR) 5 mg tablet   No No   Sig: TAKE ONE TABLET BY MOUTH DAILY      Facility-Administered Medications: None       Past Medical History:   Diagnosis Date    Anxiety     Bipolar 1 disorder (HCC)     Depression     PTSD (post-traumatic stress disorder)        History reviewed. No pertinent surgical history.    Family History   Problem Relation Age of Onset    Cancer Mother         Oral    No Known Problems Sister     No  Known Problems Daughter      I have reviewed and agree with the history as documented.    E-Cigarette/Vaping    E-Cigarette Use Never User      E-Cigarette/Vaping Substances     Social History     Tobacco Use    Smoking status: Never    Smokeless tobacco: Never   Vaping Use    Vaping Use: Never used   Substance Use Topics    Alcohol use: Yes     Comment: occ, 2x per month    Drug use: Not Currently     Types: Marijuana       Review of Systems   Constitutional:  Negative for activity change, chills, fatigue and fever.   HENT:  Negative for congestion, drooling, ear discharge, ear pain, sore throat and trouble swallowing.    Eyes:  Negative for pain, discharge, itching and visual disturbance.   Respiratory:  Negative for cough and shortness of breath.    Cardiovascular:  Positive for chest pain. Negative for palpitations.   Gastrointestinal:  Negative for abdominal pain, anorexia and vomiting.   Endocrine: Negative for cold intolerance and heat intolerance.   Genitourinary:  Negative for dysuria and hematuria.   Musculoskeletal:  Negative for arthralgias and back pain.   Skin:  Negative for color change and rash.   Allergic/Immunologic: Negative for environmental allergies and food allergies.   Neurological:  Negative for dizziness, seizures, syncope, facial asymmetry and headaches.   Hematological:  Negative for adenopathy.   Psychiatric/Behavioral:  Negative for agitation, confusion, decreased concentration, hallucinations and self-injury.    All other systems reviewed and are negative.      Physical Exam  Physical Exam  Vitals and nursing note reviewed.   Constitutional:       General: He is not in acute distress.     Appearance: He is well-developed.   HENT:      Head: Normocephalic and atraumatic.   Eyes:      Conjunctiva/sclera: Conjunctivae normal.   Neck:      Thyroid: No thyromegaly.      Vascular: No hepatojugular reflux.   Cardiovascular:      Rate and Rhythm: Normal rate and regular rhythm.      Heart  sounds: Normal heart sounds. No murmur heard.  Pulmonary:      Effort: Pulmonary effort is normal. No tachypnea or respiratory distress.      Breath sounds: Normal breath sounds. No stridor.   Abdominal:      Palpations: Abdomen is soft. There is no splenomegaly or mass.      Tenderness: There is no abdominal tenderness. There is no guarding.   Musculoskeletal:         General: No swelling. Normal range of motion.      Cervical back: Normal range of motion and neck supple.   Skin:     General: Skin is warm and dry.      Capillary Refill: Capillary refill takes less than 2 seconds.   Neurological:      General: No focal deficit present.      Mental Status: He is alert.   Psychiatric:         Mood and Affect: Mood normal.         Vital Signs  ED Triage Vitals [12/12/23 1839]   Temperature Pulse Respirations Blood Pressure SpO2   98.5 °F (36.9 °C) 92 22 (!) 171/97 98 %      Temp Source Heart Rate Source Patient Position - Orthostatic VS BP Location FiO2 (%)   Oral Monitor Sitting Right arm --      Pain Score       --           Vitals:    12/12/23 1839 12/12/23 2106   BP: (!) 171/97 126/64   Pulse: 92 77   Patient Position - Orthostatic VS: Sitting          Visual Acuity      ED Medications  Medications - No data to display    Diagnostic Studies  Results Reviewed       Procedure Component Value Units Date/Time    D-dimer, quantitative [[id number]]  (Normal) Collected: 12/12/23 1843    Lab Status: Final result Specimen: Blood from Arm, Right Updated: 12/12/23 2136     D-Dimer, Quant <0.27 ug/ml FEU     HS Troponin I 2hr [[id number]] Collected: 12/12/23 2047    Lab Status: Final result Specimen: Blood from Hand, Right Updated: 12/12/23 2118     hs TnI 2hr <2 ng/L      Delta 2hr hsTnI --    HS Troponin I 4hr [[id number]]     Lab Status: No result Specimen: Blood     HS Troponin 0hr (reflex protocol) [[id number]]  (Normal) Collected: 12/12/23 1843    Lab Status: Final result Specimen: Blood from Arm, Right Updated: 12/12/23  1924     hs TnI 0hr <2 ng/L     Comprehensive metabolic panel [[id number]]  (Abnormal) Collected: 12/12/23 1843    Lab Status: Final result Specimen: Blood from Arm, Right Updated: 12/12/23 1921     Sodium 136 mmol/L      Potassium 3.9 mmol/L      Chloride 104 mmol/L      CO2 25 mmol/L      ANION GAP 7 mmol/L      BUN 8 mg/dL      Creatinine 0.97 mg/dL      Glucose 168 mg/dL      Calcium 9.0 mg/dL      AST 20 U/L      ALT 35 U/L      Alkaline Phosphatase 52 U/L      Total Protein 6.7 g/dL      Albumin 4.3 g/dL      Total Bilirubin 0.46 mg/dL      eGFR 101 ml/min/1.73sq m     Narrative:      National Kidney Disease Foundation guidelines for Chronic Kidney Disease (CKD):     Stage 1 with normal or high GFR (GFR > 90 mL/min/1.73 square meters)    Stage 2 Mild CKD (GFR = 60-89 mL/min/1.73 square meters)    Stage 3A Moderate CKD (GFR = 45-59 mL/min/1.73 square meters)    Stage 3B Moderate CKD (GFR = 30-44 mL/min/1.73 square meters)    Stage 4 Severe CKD (GFR = 15-29 mL/min/1.73 square meters)    Stage 5 End Stage CKD (GFR <15 mL/min/1.73 square meters)  Note: GFR calculation is accurate only with a steady state creatinine    CBC and differential [[id number]] Collected: 12/12/23 1843    Lab Status: Final result Specimen: Blood from Arm, Right Updated: 12/12/23 1856     WBC 8.71 Thousand/uL      RBC 4.77 Million/uL      Hemoglobin 14.2 g/dL      Hematocrit 42.1 %      MCV 88 fL      MCH 29.8 pg      MCHC 33.7 g/dL      RDW 12.5 %      MPV 11.6 fL      Platelets 174 Thousands/uL      nRBC 0 /100 WBCs      Neutrophils Relative 67 %      Immat GRANS % 1 %      Lymphocytes Relative 23 %      Monocytes Relative 6 %      Eosinophils Relative 2 %      Basophils Relative 1 %      Neutrophils Absolute 5.93 Thousands/µL      Immature Grans Absolute 0.05 Thousand/uL      Lymphocytes Absolute 2.01 Thousands/µL      Monocytes Absolute 0.49 Thousand/µL      Eosinophils Absolute 0.17 Thousand/µL      Basophils Absolute 0.06 Thousands/µL                     XR chest 1 view portable    (Results Pending)              Procedures  Procedures         ED Course     33-year-old male presents for evaluation of right-sided chest pain.  EKG is nonischemic.  Troponin negative x 2.  Chest x-ray is clear.  Patient denies any active chest pain at this time.  Heart score low risk.  Patient is comfortable going home with cardiology follow-up within 1 week.  Return precautions provided.  Patient discharged in stable condition with cardiology follow-up.                                            Medical Decision Making  33-year-old male presents for evaluation of right-sided chest pain.  EKG is nonischemic.  Troponin negative x 2.  Chest x-ray is clear.  Patient denies any active chest pain at this time.  Heart score low risk.  Patient is comfortable going home with cardiology follow-up within 1 week.  Return precautions provided.  Patient discharged in stable condition with cardiology follow-up.    Amount and/or Complexity of Data Reviewed  External Data Reviewed: labs, radiology and ECG.  Labs: ordered.  Radiology: ordered.             Disposition  Final diagnoses:   Chest pain     Time reflects when diagnosis was documented in both MDM as applicable and the Disposition within this note       Time User Action Codes Description Comment    12/12/2023 10:06 PM Peyman Arreola Add [R07.9] Chest pain           ED Disposition       ED Disposition   Discharge    Condition   Stable    Date/Time   Tue Dec 12, 2023 10:06 PM    Comment   Mack Cortes discharge to home/self care.                   Follow-up Information       Follow up With Specialties Details Why Contact Info Additional Information    [facility]   [address] [address]  [phone] [facility], [address] [address], [place], [place], [id number] [phone]            Patient's Medications    Discharge Prescriptions    No medications on file       No discharge procedures on file.    PDMP Review       None            ED Provider  Electronically Signed by             Peyman Arreola DO  12/12/23 7836

## 2023-12-15 ENCOUNTER — APPOINTMENT (OUTPATIENT)
Dept: LAB | Facility: CLINIC | Age: 33
End: 2023-12-15
Payer: COMMERCIAL

## 2023-12-15 ENCOUNTER — OFFICE VISIT (OUTPATIENT)
Dept: CARDIOLOGY CLINIC | Facility: CLINIC | Age: 33
End: 2023-12-15
Payer: COMMERCIAL

## 2023-12-15 VITALS
SYSTOLIC BLOOD PRESSURE: 130 MMHG | HEART RATE: 80 BPM | DIASTOLIC BLOOD PRESSURE: 78 MMHG | BODY MASS INDEX: 45.1 KG/M2 | HEIGHT: 70 IN | OXYGEN SATURATION: 98 % | WEIGHT: 315 LBS

## 2023-12-15 DIAGNOSIS — R07.9 CHEST PAIN, UNSPECIFIED TYPE: Primary | ICD-10-CM

## 2023-12-15 DIAGNOSIS — R07.9 CHEST PAIN, UNSPECIFIED TYPE: ICD-10-CM

## 2023-12-15 LAB
ATRIAL RATE: 95 BPM
CRP SERPL QL: 14.8 MG/L
P AXIS: 31 DEGREES
PR INTERVAL: 140 MS
QRS AXIS: 22 DEGREES
QRSD INTERVAL: 92 MS
QT INTERVAL: 334 MS
QTC INTERVAL: 419 MS
T WAVE AXIS: 18 DEGREES
VENTRICULAR RATE: 95 BPM

## 2023-12-15 PROCEDURE — 99203 OFFICE O/P NEW LOW 30 MIN: CPT | Performed by: INTERNAL MEDICINE

## 2023-12-15 PROCEDURE — 36415 COLL VENOUS BLD VENIPUNCTURE: CPT

## 2023-12-15 PROCEDURE — 93000 ELECTROCARDIOGRAM COMPLETE: CPT | Performed by: INTERNAL MEDICINE

## 2023-12-15 PROCEDURE — 86140 C-REACTIVE PROTEIN: CPT

## 2023-12-15 NOTE — PROGRESS NOTES
[facility]  Office Consultation  Phil Hogan 28 y.o. male MRN: [id number]        Problems    1. Chest pain, unspecified type  POCT ECG    C-reactive protein    CANCELED: Stress test only, exercise          Impression:      Right-sided pleuritic chest pain  1-1/2 weeks after a cough/upper respiratory illness/cold  Noncardiac by description  Some reproducibility right side of the chest/upper sternum    Plan    Ibuprofen 600 mg 3 times a day for 3 to 4 days until resolved  Check CRP  As needed follow-up with me, see his PCP if the pain does not resolve. We discussed his need for weight loss, improved diet, to improve his cholesterol. But he is low risk based on his young age. Reason for Consult / Principal Problem: Chest pain    HPI: Phil Hogan is a 28y.o. year old male , wife in the room with him today, 3year-old daughter at home, not working due to disability due to PTSD/mental illness, taking college classes, trying to get into the [employer], who had a cold/URI about 2 weeks ago, but a week of coughing, then awoke this week with right-sided upper chest discomfort that was worse with deep inspiration. Troponins negative in the ER, EKG unremarkable, chest x-ray unremarkable. He does note that there is been slow improvement since he was evaluated in the ER. He has not specifically taken any medical therapy for it. Review of Systems   Constitutional:  Negative for appetite change, diaphoresis, fatigue and fever. Respiratory:  Negative for chest tightness, shortness of breath and wheezing. Cardiovascular:  Positive for chest pain. Negative for palpitations and leg swelling. Gastrointestinal:  Negative for abdominal pain and blood in stool. Musculoskeletal:  Negative for arthralgias and joint swelling. Skin:  Negative for rash. Neurological:  Negative for dizziness, syncope and light-headedness.          Past Medical History:   Diagnosis Date   • Anxiety    • Bipolar 1 disorder (HCC)    • Depression    • PTSD (post-traumatic stress disorder)      History reviewed. No pertinent surgical history. Social History     Substance and Sexual Activity   Alcohol Use Yes    Comment: occ, 2x per month     Social History     Substance and Sexual Activity   Drug Use Not Currently   • Types: Marijuana     Social History     Tobacco Use   Smoking Status Never   Smokeless Tobacco Never     Family History   Problem Relation Age of Onset   • Cancer Mother         Oral   • No Known Problems Sister    • No Known Problems Daughter        Allergies:  No Known Allergies    Medications:     Current Outpatient Medications:   •  allopurinol (ZYLOPRIM) 300 mg tablet, TAKE ONE TABLET BY MOUTH EVERY DAY, Disp: 90 tablet, Rfl: 1  •  divalproex sodium (DEPAKOTE ER) 500 mg 24 hr tablet, Take 500 mg by mouth daily, Disp: , Rfl:   •  divalproex sodium (DEPAKOTE) 500 mg DR tablet, , Disp: , Rfl:   •  levothyroxine 75 mcg tablet, Take 1 tablet (75 mcg total) by mouth daily, Disp: 90 tablet, Rfl: 0  •  lithium 600 MG capsule, Take 600 mg by mouth 2 (two) times a day with meals, Disp: , Rfl:   •  risperiDONE (RisperDAL) 1 mg tablet, Take 1 mg by mouth daily, Disp: , Rfl:   •  rosuvastatin (CRESTOR) 5 mg tablet, TAKE ONE TABLET BY MOUTH DAILY, Disp: 90 tablet, Rfl: 0  •  lithium carbonate 300 mg capsule, , Disp: , Rfl:       Vitals:    12/15/23 0953   BP: 130/78   Pulse: 80   SpO2: 98%     Weight (last 2 days)     Date/Time Weight    12/15/23 0953 147 (323.4)        Physical Exam  Constitutional:       General: He is not in acute distress. Appearance: He is not diaphoretic. HENT:      Head: Normocephalic and atraumatic. Eyes:      General: No scleral icterus. Conjunctiva/sclera: Conjunctivae normal.   Neck:      Vascular: No JVD. Cardiovascular:      Rate and Rhythm: Normal rate and regular rhythm. Heart sounds: Normal heart sounds. No murmur heard.   Pulmonary:      Effort: Pulmonary effort is normal. No respiratory distress. Breath sounds: Normal breath sounds. No decreased breath sounds, wheezing, rhonchi or rales. Musculoskeletal:      Cervical back: Normal range of motion. Right lower leg: Normal. No edema. Left lower leg: Normal. No edema. Skin:     General: Skin is warm and dry. Neurological:      Mental Status: He is alert and oriented to person, place, and time. Laboratory Studies:    Laboratory studies personally reviewed    Cardiac testing:     EKG reviewed personally:   Results for orders placed or performed in visit on 12/15/23   POCT ECG    Impression    Normal sinus rhythm, incomplete right bundle branch block       Echocardiogram:      Stress test:      Holter:      Cardiac catheterization:        Josefina Montes MD    Portions of the record may have been created with voice recognition software. Occasional wrong word or "sound a like" substitutions may have occurred due to the inherent limitations of voice recognition software. Read the chart carefully and recognize, using context, where substitutions have occurred.

## 2024-01-03 ENCOUNTER — APPOINTMENT (OUTPATIENT)
Dept: LAB | Facility: MEDICAL CENTER | Age: 34
End: 2024-01-03
Payer: COMMERCIAL

## 2024-01-03 DIAGNOSIS — F43.12 PROLONGED POSTTRAUMATIC STRESS DISORDER: ICD-10-CM

## 2024-01-03 DIAGNOSIS — F31.9 BIPOLAR AFFECTIVE DISORDER, REMISSION STATUS UNSPECIFIED (HCC): ICD-10-CM

## 2024-01-03 DIAGNOSIS — Z51.81 ENCOUNTER FOR THERAPEUTIC DRUG MONITORING: ICD-10-CM

## 2024-01-03 LAB
LITHIUM SERPL-SCNC: 1.1 MMOL/L (ref 0.6–1.2)
TSH SERPL DL<=0.05 MIU/L-ACNC: 2.14 UIU/ML (ref 0.45–4.5)
VALPROATE SERPL-MCNC: 45 UG/ML (ref 50–100)

## 2024-01-03 PROCEDURE — 80164 ASSAY DIPROPYLACETIC ACD TOT: CPT

## 2024-01-03 PROCEDURE — 36415 COLL VENOUS BLD VENIPUNCTURE: CPT

## 2024-01-03 PROCEDURE — 84443 ASSAY THYROID STIM HORMONE: CPT

## 2024-01-03 PROCEDURE — 80178 ASSAY OF LITHIUM: CPT

## 2024-01-10 DIAGNOSIS — E78.2 MIXED HYPERLIPIDEMIA: ICD-10-CM

## 2024-01-11 RX ORDER — ROSUVASTATIN CALCIUM 5 MG/1
5 TABLET, COATED ORAL DAILY
Qty: 90 TABLET | Refills: 0 | Status: SHIPPED | OUTPATIENT
Start: 2024-01-11

## 2024-01-31 ENCOUNTER — APPOINTMENT (OUTPATIENT)
Dept: LAB | Facility: MEDICAL CENTER | Age: 34
End: 2024-01-31
Payer: COMMERCIAL

## 2024-01-31 DIAGNOSIS — Z51.81 ENCOUNTER FOR THERAPEUTIC DRUG MONITORING: ICD-10-CM

## 2024-01-31 DIAGNOSIS — F31.9 BIPOLAR AFFECTIVE DISORDER, REMISSION STATUS UNSPECIFIED (HCC): ICD-10-CM

## 2024-01-31 DIAGNOSIS — F43.12 PROLONGED POSTTRAUMATIC STRESS DISORDER: ICD-10-CM

## 2024-01-31 LAB — LITHIUM SERPL-SCNC: 0.62 MMOL/L (ref 0.6–1.2)

## 2024-01-31 PROCEDURE — 36415 COLL VENOUS BLD VENIPUNCTURE: CPT

## 2024-01-31 PROCEDURE — 80178 ASSAY OF LITHIUM: CPT

## 2024-02-20 DIAGNOSIS — E03.9 HYPOTHYROIDISM, UNSPECIFIED TYPE: ICD-10-CM

## 2024-02-21 RX ORDER — LEVOTHYROXINE SODIUM 0.07 MG/1
75 TABLET ORAL DAILY
Qty: 90 TABLET | Refills: 1 | Status: SHIPPED | OUTPATIENT
Start: 2024-02-21

## 2024-03-12 ENCOUNTER — APPOINTMENT (OUTPATIENT)
Dept: LAB | Facility: MEDICAL CENTER | Age: 34
End: 2024-03-12
Payer: COMMERCIAL

## 2024-03-12 DIAGNOSIS — F31.9 BIPOLAR AFFECTIVE DISORDER, REMISSION STATUS UNSPECIFIED (HCC): ICD-10-CM

## 2024-03-12 DIAGNOSIS — F43.12 PROLONGED POSTTRAUMATIC STRESS DISORDER: ICD-10-CM

## 2024-03-12 LAB — VALPROATE SERPL-MCNC: 40 UG/ML (ref 50–100)

## 2024-03-12 PROCEDURE — 36415 COLL VENOUS BLD VENIPUNCTURE: CPT

## 2024-03-12 PROCEDURE — 80164 ASSAY DIPROPYLACETIC ACD TOT: CPT

## 2024-03-14 ENCOUNTER — OFFICE VISIT (OUTPATIENT)
Dept: FAMILY MEDICINE CLINIC | Facility: MEDICAL CENTER | Age: 34
End: 2024-03-14
Payer: COMMERCIAL

## 2024-03-14 ENCOUNTER — APPOINTMENT (OUTPATIENT)
Dept: LAB | Facility: MEDICAL CENTER | Age: 34
End: 2024-03-14
Payer: COMMERCIAL

## 2024-03-14 VITALS
HEART RATE: 65 BPM | TEMPERATURE: 98.4 F | WEIGHT: 315 LBS | RESPIRATION RATE: 17 BRPM | OXYGEN SATURATION: 98 % | BODY MASS INDEX: 46.03 KG/M2 | SYSTOLIC BLOOD PRESSURE: 132 MMHG | DIASTOLIC BLOOD PRESSURE: 76 MMHG

## 2024-03-14 DIAGNOSIS — R39.9 UTI SYMPTOMS: ICD-10-CM

## 2024-03-14 DIAGNOSIS — N30.01 ACUTE CYSTITIS WITH HEMATURIA: ICD-10-CM

## 2024-03-14 DIAGNOSIS — N30.01 ACUTE CYSTITIS WITH HEMATURIA: Primary | ICD-10-CM

## 2024-03-14 DIAGNOSIS — R31.9 HEMATURIA, UNSPECIFIED TYPE: ICD-10-CM

## 2024-03-14 LAB
ANION GAP SERPL CALCULATED.3IONS-SCNC: 9 MMOL/L (ref 4–13)
BASOPHILS # BLD AUTO: 0.06 THOUSANDS/ÂΜL (ref 0–0.1)
BASOPHILS NFR BLD AUTO: 1 % (ref 0–1)
BUN SERPL-MCNC: 8 MG/DL (ref 5–25)
CALCIUM SERPL-MCNC: 9.5 MG/DL (ref 8.4–10.2)
CHLORIDE SERPL-SCNC: 101 MMOL/L (ref 96–108)
CO2 SERPL-SCNC: 27 MMOL/L (ref 21–32)
CREAT SERPL-MCNC: 1.11 MG/DL (ref 0.6–1.3)
EOSINOPHIL # BLD AUTO: 0.09 THOUSAND/ÂΜL (ref 0–0.61)
EOSINOPHIL NFR BLD AUTO: 1 % (ref 0–6)
ERYTHROCYTE [DISTWIDTH] IN BLOOD BY AUTOMATED COUNT: 13 % (ref 11.6–15.1)
GFR SERPL CREATININE-BSD FRML MDRD: 86 ML/MIN/1.73SQ M
GLUCOSE P FAST SERPL-MCNC: 61 MG/DL (ref 65–99)
HCT VFR BLD AUTO: 42.6 % (ref 36.5–49.3)
HGB BLD-MCNC: 14.1 G/DL (ref 12–17)
IMM GRANULOCYTES # BLD AUTO: 0.07 THOUSAND/UL (ref 0–0.2)
IMM GRANULOCYTES NFR BLD AUTO: 1 % (ref 0–2)
LYMPHOCYTES # BLD AUTO: 2.26 THOUSANDS/ÂΜL (ref 0.6–4.47)
LYMPHOCYTES NFR BLD AUTO: 19 % (ref 14–44)
MCH RBC QN AUTO: 30.1 PG (ref 26.8–34.3)
MCHC RBC AUTO-ENTMCNC: 33.1 G/DL (ref 31.4–37.4)
MCV RBC AUTO: 91 FL (ref 82–98)
MONOCYTES # BLD AUTO: 1.11 THOUSAND/ÂΜL (ref 0.17–1.22)
MONOCYTES NFR BLD AUTO: 10 % (ref 4–12)
NEUTROPHILS # BLD AUTO: 8.1 THOUSANDS/ÂΜL (ref 1.85–7.62)
NEUTS SEG NFR BLD AUTO: 68 % (ref 43–75)
NRBC BLD AUTO-RTO: 0 /100 WBCS
PLATELET # BLD AUTO: 179 THOUSANDS/UL (ref 149–390)
PMV BLD AUTO: 12 FL (ref 8.9–12.7)
POTASSIUM SERPL-SCNC: 3.9 MMOL/L (ref 3.5–5.3)
RBC # BLD AUTO: 4.68 MILLION/UL (ref 3.88–5.62)
SL AMB  POCT GLUCOSE, UA: NORMAL
SL AMB LEUKOCYTE ESTERASE,UA: NORMAL
SL AMB POCT BILIRUBIN,UA: NORMAL
SL AMB POCT BLOOD,UA: NORMAL
SL AMB POCT CLARITY,UA: NORMAL
SL AMB POCT COLOR,UA: NORMAL
SL AMB POCT KETONES,UA: NORMAL
SL AMB POCT NITRITE,UA: NORMAL
SL AMB POCT PH,UA: 6
SL AMB POCT SPECIFIC GRAVITY,UA: 1
SL AMB POCT URINE PROTEIN: NORMAL
SL AMB POCT UROBILINOGEN: NORMAL
SODIUM SERPL-SCNC: 137 MMOL/L (ref 135–147)
WBC # BLD AUTO: 11.69 THOUSAND/UL (ref 4.31–10.16)

## 2024-03-14 PROCEDURE — 80048 BASIC METABOLIC PNL TOTAL CA: CPT

## 2024-03-14 PROCEDURE — 85025 COMPLETE CBC W/AUTO DIFF WBC: CPT

## 2024-03-14 PROCEDURE — 81003 URINALYSIS AUTO W/O SCOPE: CPT

## 2024-03-14 PROCEDURE — 99214 OFFICE O/P EST MOD 30 MIN: CPT

## 2024-03-14 PROCEDURE — 36415 COLL VENOUS BLD VENIPUNCTURE: CPT

## 2024-03-14 PROCEDURE — 87086 URINE CULTURE/COLONY COUNT: CPT

## 2024-03-14 RX ORDER — CIPROFLOXACIN 500 MG/1
500 TABLET, FILM COATED ORAL EVERY 12 HOURS SCHEDULED
Qty: 14 TABLET | Refills: 0 | Status: SHIPPED | OUTPATIENT
Start: 2024-03-14 | End: 2024-03-21

## 2024-03-14 NOTE — PROGRESS NOTES
Assessment/Plan:       1. Acute cystitis with hematuria  Check labs as below.  Check CT renal stone study.  Recheck UA with reflex to culture 1 week after completion of antibiotic.  Start Cipro take as directed x 7 days, will treat for UTI and cover for pyelonephritis.  Advised to take probiotic daily while on Cipro.  - CBC and differential; Future  - Basic metabolic panel; Future  - CT renal stone study abdomen pelvis wo contrast; Future  - UA w Reflex to Microscopic w Reflex to Culture; Future  - ciprofloxacin (CIPRO) 500 mg tablet; Take 1 tablet (500 mg total) by mouth every 12 (twelve) hours for 7 days  Dispense: 14 tablet; Refill: 0    2. Hematuria, unspecified type   3/14/2024   Color, UA dark yellow    Clarity, UA cloudy    SL AMB SPECIFIC GRAVITY_URINE 1.005    pH, UA 6.0    Leukocytes, UA 3+    Nitrite, UA -    Glucose, UA -    Ketones, UA -    BILIRUBIN,UA -    Blood, UA 3+    POCT URINE PROTEIN 1+    SL AMB POCT UROBILINOGEN -      Send urine for culture.  - POCT urine dip auto non-scope    3. UTI symptoms   3/14/2024   Color, UA dark yellow    Clarity, UA cloudy    SL AMB SPECIFIC GRAVITY_URINE 1.005    pH, UA 6.0    Leukocytes, UA 3+    Nitrite, UA -    Glucose, UA -    Ketones, UA -    BILIRUBIN,UA -    Blood, UA 3+    POCT URINE PROTEIN 1+    SL AMB POCT UROBILINOGEN -      Send urine for culture.  - POCT urine dip auto non-scope      Subjective:      Patient ID: Mack Cortes is a 33 y.o. male.    33 year old male presents with complaint of right flank pain that started yesterday, reports pain started to radiate to right groin with urinary frequency, pain, burning, urgency and hematuria which he noticed today. He also reports nausea and sweating this morning.   Denies history of kidney stones, UTI, hematuria, pyelonephritis.   Denies testicular pain, swelling. Denies pain in penis other than while urinating.   Denies fever, chills, abdominal pain, vomiting, diarrhea, constipation.       Groin  Pain  The patient's primary symptoms include penile pain (only associated with urination). The patient's pertinent negatives include no penile discharge, scrotal swelling or testicular pain. Associated symptoms include dysuria, flank pain (right), frequency, nausea and urgency. Pertinent negatives include no abdominal pain, constipation, diarrhea or vomiting.       The following portions of the patient's history were reviewed and updated as appropriate: allergies, current medications, past family history, past social history, past surgical history, and problem list.    Review of Systems   Constitutional: Negative.    HENT: Negative.     Eyes: Negative.    Respiratory: Negative.     Cardiovascular: Negative.    Gastrointestinal:  Positive for nausea. Negative for abdominal distention, abdominal pain, anal bleeding, blood in stool, constipation, diarrhea, rectal pain and vomiting.   Endocrine: Negative.    Genitourinary:  Positive for dysuria, flank pain (right), frequency, hematuria, penile pain (only associated with urination) and urgency. Negative for difficulty urinating, penile discharge, penile swelling, scrotal swelling and testicular pain.   Skin: Negative.    Allergic/Immunologic: Negative.    Neurological: Negative.    Hematological: Negative.    Psychiatric/Behavioral: Negative.           Objective:      /76 (BP Location: Left arm, Patient Position: Sitting, Cuff Size: Large)   Pulse 65   Temp 98.4 °F (36.9 °C) (Temporal)   Resp 17   Wt (!) 146 kg (320 lb 12.8 oz)   SpO2 98%   BMI 46.03 kg/m²          Physical Exam  Vitals and nursing note reviewed. Exam conducted with a chaperone present.   Constitutional:       General: He is not in acute distress.     Appearance: Normal appearance. He is obese. He is not ill-appearing.   HENT:      Head: Normocephalic and atraumatic.   Cardiovascular:      Rate and Rhythm: Normal rate and regular rhythm.      Pulses: Normal pulses.      Heart sounds: Normal  heart sounds.   Pulmonary:      Effort: Pulmonary effort is normal.      Breath sounds: Normal breath sounds.   Abdominal:      General: Bowel sounds are normal.      Palpations: Abdomen is soft.      Tenderness: There is no abdominal tenderness. There is no right CVA tenderness or left CVA tenderness.   Genitourinary:     Penis: Normal.       Testes: Normal.   Skin:     General: Skin is warm and dry.   Neurological:      General: No focal deficit present.      Mental Status: He is alert and oriented to person, place, and time. Mental status is at baseline.   Psychiatric:         Mood and Affect: Mood normal.         Behavior: Behavior normal.         Thought Content: Thought content normal.                    LATONIA Al

## 2024-03-15 DIAGNOSIS — D72.829 LEUKOCYTOSIS, UNSPECIFIED TYPE: Primary | ICD-10-CM

## 2024-03-15 LAB — BACTERIA UR CULT: NORMAL

## 2024-03-22 ENCOUNTER — HOSPITAL ENCOUNTER (OUTPATIENT)
Dept: RADIOLOGY | Facility: MEDICAL CENTER | Age: 34
Discharge: HOME | End: 2024-03-22
Payer: COMMERCIAL

## 2024-03-22 DIAGNOSIS — N26.1 ATROPHY OF RIGHT KIDNEY: Primary | ICD-10-CM

## 2024-03-22 DIAGNOSIS — N30.01 ACUTE CYSTITIS WITH HEMATURIA: ICD-10-CM

## 2024-03-22 PROCEDURE — 74176 CT ABD & PELVIS W/O CONTRAST: CPT

## 2024-03-25 ENCOUNTER — TELEPHONE (OUTPATIENT)
Dept: FAMILY MEDICINE CLINIC | Facility: MEDICAL CENTER | Age: 34
End: 2024-03-25

## 2024-03-28 ENCOUNTER — APPOINTMENT (OUTPATIENT)
Dept: LAB | Facility: MEDICAL CENTER | Age: 34
End: 2024-03-28
Payer: COMMERCIAL

## 2024-03-28 DIAGNOSIS — N30.01 ACUTE CYSTITIS WITH HEMATURIA: ICD-10-CM

## 2024-03-28 LAB
BACTERIA UR QL AUTO: NORMAL /HPF
BILIRUB UR QL STRIP: NEGATIVE
CLARITY UR: CLEAR
COLOR UR: COLORLESS
GLUCOSE UR STRIP-MCNC: NEGATIVE MG/DL
HGB UR QL STRIP.AUTO: NEGATIVE
KETONES UR STRIP-MCNC: NEGATIVE MG/DL
LEUKOCYTE ESTERASE UR QL STRIP: NEGATIVE
NITRITE UR QL STRIP: NEGATIVE
NON-SQ EPI CELLS URNS QL MICRO: NORMAL /HPF
PH UR STRIP.AUTO: 6 [PH]
PROT UR STRIP-MCNC: ABNORMAL MG/DL
RBC #/AREA URNS AUTO: NORMAL /HPF
SP GR UR STRIP.AUTO: 1.01 (ref 1–1.03)
UROBILINOGEN UR STRIP-ACNC: <2 MG/DL
WBC #/AREA URNS AUTO: NORMAL /HPF

## 2024-03-28 PROCEDURE — 81001 URINALYSIS AUTO W/SCOPE: CPT

## 2024-04-15 DIAGNOSIS — M1A.9XX0 CHRONIC GOUT INVOLVING TOE OF RIGHT FOOT WITHOUT TOPHUS, UNSPECIFIED CAUSE: ICD-10-CM

## 2024-04-16 RX ORDER — ALLOPURINOL 300 MG/1
300 TABLET ORAL DAILY
Qty: 90 TABLET | Refills: 0 | Status: SHIPPED | OUTPATIENT
Start: 2024-04-16

## 2024-05-16 ENCOUNTER — CONSULT (OUTPATIENT)
Dept: NEPHROLOGY | Facility: CLINIC | Age: 34
End: 2024-05-16
Payer: COMMERCIAL

## 2024-05-16 VITALS
WEIGHT: 315 LBS | HEIGHT: 70 IN | DIASTOLIC BLOOD PRESSURE: 70 MMHG | HEART RATE: 70 BPM | BODY MASS INDEX: 45.1 KG/M2 | SYSTOLIC BLOOD PRESSURE: 128 MMHG

## 2024-05-16 DIAGNOSIS — N26.1 ATROPHY OF RIGHT KIDNEY: ICD-10-CM

## 2024-05-16 DIAGNOSIS — N26.1 ATROPHIC KIDNEY: Primary | ICD-10-CM

## 2024-05-16 PROCEDURE — 99244 OFF/OP CNSLTJ NEW/EST MOD 40: CPT | Performed by: INTERNAL MEDICINE

## 2024-05-16 NOTE — PATIENT INSTRUCTIONS
You are here for your initial visit it was very nice to meet you and thank you for your history.  You describe to me what sounded like a kidney stone event and we did look at your most recent CAT scan and fortunately there are no stones visualized in either kidney at this point.  They did note that your right kidney is atrophic which means small.  We looked at it together and you saw that yourself to my eyes the left kidney looks a little large so I have a suspicion this has been present since you were very young if not since you were born.    There is no evidence of kidney disease and your blood work for creatinine is around 1 and that is normal for the kidney function.    For now we will just continue routine health try and lose some weight to avoid hyperfiltration.  You do not have high blood pressure diabetes those are things that can affect your kidney function in the future so hopefully that will not be the case.

## 2024-05-16 NOTE — PROGRESS NOTES
Consultation - Nephrology   Mack Cortes 33 y.o. male MRN: [id number]  Unit/Bed#:  Encounter: [id number]      Assessment & Plan     Assessment / Plan:  1.  Atrophic kidney    The patient was incidentally discovered to have right renal atrophy when he had a screening CAT scan for what sounds like a kidney stone event.  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  Saying that his creatinine is 1 which is normal.    I reinforced to him that his blood pressure is normal and at this point kidney function is normal despite the discrepancy in size.  Both kidneys are functioning it shows that the left kidney likely contributes a higher percentage.    I told him just to try and lose some weight to reduce hyperfiltration injury also to help reduce his risk of getting diabetes or high blood pressure which eventually could cause kidney insufficiency but this point he can continue as if he has normal kidney function with no changes.    Please feel free to give me a call or have him come back if there is any problems in the future.    Of note there were no visualized stones in either kidney at this point.          History of Present Illness   Physician Requesting Consult: No att. providers found  Reason for Consult / Principal Problem: Atrophic kidney  Hx and PE limited by:   HPI: Mack Cortes is a 33 y.o. year old male who presents for his first visit.  In March the patient started experiencing significant right-sided flank pain.  He describes that the pain migrated down to his groin and then he was having a lot of pain he urinated blood and then after a day or so there is pain with improved and the urine cleared up.  He then had a screening CAT scan and it showed right renal atrophy he is here for opinion and recommendations.  History  obtained from chart review and the patient.    Consults    Review of Systems   Constitutional:  Negative for appetite change, chills, diaphoresis and fever.   HENT: Negative.     Eyes: Negative.    Respiratory:  Negative for cough, chest tightness, shortness of breath and wheezing.    Cardiovascular:  Negative for chest pain, palpitations and leg swelling.   Gastrointestinal:  Negative for abdominal pain, diarrhea, nausea and vomiting.   Genitourinary:  Negative for difficulty urinating, dysuria and hematuria.   Neurological:  Negative for dizziness, light-headedness and headaches.   Psychiatric/Behavioral:  Negative for agitation, behavioral problems, confusion and decreased concentration.        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  No definitive kidney stone event in the past.  No past surgical history on file.  Social History   Social History     Substance and Sexual Activity   Alcohol Use Yes    Comment: occ, 2x per month     Social History     Substance and Sexual Activity   Drug Use Not Currently    Types: Marijuana   Was in the [employer].  No tobacco ethanol or drug abuse.  Social History     Tobacco Use   Smoking Status Never   Smokeless Tobacco Never     Family History   Problem Relation Age of Onset    Cancer Mother         Oral    No Known Problems Sister     No Known Problems Daughter      No family  "history of kidney disease that he is aware of.  He did state that his sister had to have \"kidney surgery\" when she was little.  Meds/Allergies   current meds:   No current facility-administered medications for this visit.     No Known Allergies    Objective   [unfilled]  Body mass index is 47.21 kg/m².    Invasive Devices:        PHYSICAL EXAM:  /70 (BP Location: Right arm, Patient Position: Sitting, Cuff Size: Standard)   Pulse 70   Ht 5' 10\" (1.778 m)   Wt (!) 149 kg (329 lb)   BMI 47.21 kg/m²     Physical Exam  Constitutional:       General: He is not in acute distress.     Appearance: He is not ill-appearing or toxic-appearing.   HENT:      Head: Normocephalic and atraumatic.      Nose: Nose normal.      Mouth/Throat:      Mouth: Mucous membranes are dry.   Eyes:      General: No scleral icterus.     Extraocular Movements: Extraocular movements intact.   Cardiovascular:      Rate and Rhythm: Normal rate and regular rhythm.      Heart sounds:      No friction rub. No gallop.      Comments: No edema.  Pulmonary:      Effort: Pulmonary effort is normal. No respiratory distress.      Breath sounds: No wheezing, rhonchi or rales.   Abdominal:      General: Bowel sounds are normal. There is no distension.      Palpations: Abdomen is soft.      Tenderness: There is no abdominal tenderness. There is no rebound.   Musculoskeletal:      Cervical back: Normal range of motion and neck supple.   Neurological:      General: No focal deficit present.      Mental Status: He is alert and oriented to person, place, and time. Mental status is at baseline.   Psychiatric:         Mood and Affect: Mood normal.         Behavior: Behavior normal.         Thought Content: Thought content normal.           Current Weight: Weight - Scale: (!) 149 kg (329 lb)  First Weight: Weight - Scale: (!) 149 kg (329 lb)    Lab Results:              Invalid input(s): \"LABGLOM\"        Invalid input(s): \"LABALBU\"          "

## 2024-05-16 NOTE — LETTER
May 16, 2024     LATONIA Al[address]  [address]  [facility] [address]    Patient: Mack Cortes   YOB: 1990   Date of Visit: 5/16/2024       Dear Dr. Calderon:    Thank you for referring Mack Cortes to me for evaluation. Below are my notes for this consultation.    If you have questions, please do not hesitate to call me. I look forward to following your patient along with you.         Sincerely,        Juan Chavez MD        CC: No Recipients    Juan Chavez MD  5/16/2024  2:01 PM  Sign when Signing Visit  Consultation - Nephrology   Mack Cortes 33 y.o. male MRN: [id number]  Unit/Bed#:  Encounter: [id number]      Assessment & Plan    Assessment / Plan:  1.  Atrophic kidney    The patient was incidentally discovered to have right renal atrophy when he had a screening CAT scan for what sounds like a kidney stone event.  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  Saying that his creatinine is 1 which is normal.    I reinforced to him that his blood pressure is normal and at this point kidney function is normal despite the discrepancy in size.  Both kidneys are functioning it shows that the left kidney likely contributes a higher percentage.    I told him just to try and lose some weight to reduce hyperfiltration injury also to help reduce his risk of getting diabetes or high blood pressure which eventually could cause kidney insufficiency but this point he can continue as if he has normal kidney function with no changes.    Please feel free to give me a call or have him come back if there is any problems in the future.    Of note there were no visualized stones in either kidney at this point.          History of Present Illness  Physician Requesting Consult: No att. providers found  Reason for  Consult / Principal Problem: Atrophic kidney  Hx and PE limited by:   HPI: Mack Cortes is a 33 y.o. year old male who presents for his first visit.  In March the patient started experiencing significant right-sided flank pain.  He describes that the pain migrated down to his groin and then he was having a lot of pain he urinated blood and then after a day or so there is pain with improved and the urine cleared up.  He then had a screening CAT scan and it showed right renal atrophy he is here for opinion and recommendations.  History obtained from chart review and the patient.    Consults    Review of Systems   Constitutional:  Negative for appetite change, chills, diaphoresis and fever.   HENT: Negative.     Eyes: Negative.    Respiratory:  Negative for cough, chest tightness, shortness of breath and wheezing.    Cardiovascular:  Negative for chest pain, palpitations and leg swelling.   Gastrointestinal:  Negative for abdominal pain, diarrhea, nausea and vomiting.   Genitourinary:  Negative for difficulty urinating, dysuria and hematuria.   Neurological:  Negative for dizziness, light-headedness and headaches.   Psychiatric/Behavioral:  Negative for agitation, behavioral problems, confusion and decreased concentration.        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  No definitive kidney stone event in the  "past.  No past surgical history on file.  Social History  Social History     Substance and Sexual Activity   Alcohol Use Yes    Comment: occ, 2x per month     Social History     Substance and Sexual Activity   Drug Use Not Currently   • Types: Marijuana   Was in the [employer].  No tobacco ethanol or drug abuse.  Social History     Tobacco Use   Smoking Status Never   Smokeless Tobacco Never     Family History   Problem Relation Age of Onset   • Cancer Mother         Oral   • No Known Problems Sister    • No Known Problems Daughter      No family history of kidney disease that he is aware of.  He did state that his sister had to have \"kidney surgery\" when she was little.  Meds/Allergies  current meds:   No current facility-administered medications for this visit.     No Known Allergies    Objective  [unfilled]  Body mass index is 47.21 kg/m².    Invasive Devices:        PHYSICAL EXAM:  /70 (BP Location: Right arm, Patient Position: Sitting, Cuff Size: Standard)   Pulse 70   Ht 5' 10\" (1.778 m)   Wt (!) 149 kg (329 lb)   BMI 47.21 kg/m²     Physical Exam  Constitutional:       General: He is not in acute distress.     Appearance: He is not ill-appearing or toxic-appearing.   HENT:      Head: Normocephalic and atraumatic.      Nose: Nose normal.      Mouth/Throat:      Mouth: Mucous membranes are dry.   Eyes:      General: No scleral icterus.     Extraocular Movements: Extraocular movements intact.   Cardiovascular:      Rate and Rhythm: Normal rate and regular rhythm.      Heart sounds:      No friction rub. No gallop.      Comments: No edema.  Pulmonary:      Effort: Pulmonary effort is normal. No respiratory distress.      Breath sounds: No wheezing, rhonchi or rales.   Abdominal:      General: Bowel sounds are normal. There is no distension.      Palpations: Abdomen is soft.      Tenderness: There is no abdominal tenderness. There is no rebound.   Musculoskeletal:      Cervical back: Normal range of " "motion and neck supple.   Neurological:      General: No focal deficit present.      Mental Status: He is alert and oriented to person, place, and time. Mental status is at baseline.   Psychiatric:         Mood and Affect: Mood normal.         Behavior: Behavior normal.         Thought Content: Thought content normal.           Current Weight: Weight - Scale: (!) 149 kg (329 lb)  First Weight: Weight - Scale: (!) 149 kg (329 lb)    Lab Results:              Invalid input(s): \"LABGLOM\"        Invalid input(s): \"LABALBU\"          "

## 2024-05-25 ENCOUNTER — OFFICE VISIT (OUTPATIENT)
Dept: URGENT CARE | Facility: MEDICAL CENTER | Age: 34
End: 2024-05-25
Payer: COMMERCIAL

## 2024-05-25 VITALS
TEMPERATURE: 98 F | SYSTOLIC BLOOD PRESSURE: 122 MMHG | WEIGHT: 315 LBS | HEIGHT: 70 IN | RESPIRATION RATE: 18 BRPM | DIASTOLIC BLOOD PRESSURE: 72 MMHG | OXYGEN SATURATION: 98 % | BODY MASS INDEX: 45.1 KG/M2 | HEART RATE: 80 BPM

## 2024-05-25 DIAGNOSIS — R09.81 NASAL CONGESTION: ICD-10-CM

## 2024-05-25 DIAGNOSIS — H65.03 BILATERAL ACUTE SEROUS OTITIS MEDIA, RECURRENCE NOT SPECIFIED: Primary | ICD-10-CM

## 2024-05-25 PROCEDURE — 99213 OFFICE O/P EST LOW 20 MIN: CPT | Performed by: PHYSICIAN ASSISTANT

## 2024-05-25 RX ORDER — AMOXICILLIN 500 MG/1
500 TABLET, FILM COATED ORAL 3 TIMES DAILY
Qty: 30 TABLET | Refills: 0 | Status: SHIPPED | OUTPATIENT
Start: 2024-05-25 | End: 2024-06-04

## 2024-05-25 RX ORDER — FLUTICASONE PROPIONATE 50 MCG
2 SPRAY, SUSPENSION (ML) NASAL DAILY
Qty: 15.8 ML | Refills: 0 | Status: SHIPPED | OUTPATIENT
Start: 2024-05-25 | End: 2024-06-08

## 2024-05-25 NOTE — PATIENT INSTRUCTIONS
Motrin as needed for ear pain  Use Flonase 2 sprays each nostril daily   Take Amox 500mg 3x daily x 10 days  Follow-up with PCP if symptoms worsen or persist.

## 2024-05-25 NOTE — PROGRESS NOTES
"  [facility]        NAME: Mack Cortes is a 33 y.o. male  : 1990    MRN: [id number]  DATE: May 25, 2024  TIME: 11:59 AM    Assessment and Plan   Bilateral acute serous otitis media, recurrence not specified [H65.03]  1. Bilateral acute serous otitis media, recurrence not specified  amoxicillin (AMOXIL) 500 MG tablet      2. Nasal congestion  fluticasone (FLONASE) 50 mcg/act nasal spray            Patient Instructions     Motrin as needed for ear pain  Use Flonase 2 sprays each nostril daily   Take Amox 500mg 3x daily x 10 days  Follow-up with PCP if symptoms worsen or persist.       If tests have been performed at [facility], our office will contact you with results if changes need to be made to the care plan discussed with you at the visit.  [facility]    Chief Complaint     Chief Complaint   Patient presents with    Earache     Patient states he has bilateral ear pressure, pain, ringing; states prior he had nasal drainage and facial pain          History of Present Illness       Mack a 33-year-old male who presents with a 3-day history of bilateral ear pain, nasal congestion headaches and decreased hearing.  Patient reports he had initially started with left-sided ear pain but awoke with right-sided ear pain earlier today.  He denies any ear drainage but has noticed a change in hearing and some \"popping\" in both of his ears.    Earache   There is pain in both ears. This is a new problem. The current episode started in the past 7 days. The problem occurs constantly. The problem has been gradually worsening. There has been no fever. The pain is at a severity of 5/10. Associated symptoms include headaches, hearing loss, neck pain and rhinorrhea. Pertinent negatives include no abdominal pain, coughing, diarrhea, ear discharge, rash, sore throat or vomiting.       Review of Systems   Review of Systems   Constitutional: Negative.    HENT:  Positive for ear " pain, hearing loss and rhinorrhea. Negative for ear discharge and sore throat.    Respiratory:  Negative for cough.    Gastrointestinal:  Negative for abdominal pain, diarrhea and vomiting.   Musculoskeletal:  Positive for neck pain.   Skin:  Negative for rash.   Neurological:  Positive for headaches.         Current Medications       Current Outpatient Medications:     allopurinol (ZYLOPRIM) 300 mg tablet, TAKE ONE TABLET BY MOUTH EVERY DAY, Disp: 90 tablet, Rfl: 0    amoxicillin (AMOXIL) 500 MG tablet, Take 1 tablet (500 mg total) by mouth 3 (three) times a day for 10 days, Disp: 30 tablet, Rfl: 0    divalproex sodium (DEPAKOTE ER) 500 mg 24 hr tablet, Take 500 mg by mouth daily, Disp: , Rfl:     fluticasone (FLONASE) 50 mcg/act nasal spray, 2 sprays into each nostril daily for 14 days, Disp: 15.8 mL, Rfl: 0    levothyroxine 75 mcg tablet, Take 1 tablet (75 mcg total) by mouth daily, Disp: 90 tablet, Rfl: 1    lithium 600 MG capsule, Take 600 mg by mouth 2 (two) times a day with meals, Disp: , Rfl:     risperiDONE (RisperDAL) 1 mg tablet, Take 1 mg by mouth daily, Disp: , Rfl:     rosuvastatin (CRESTOR) 5 mg tablet, TAKE ONE TABLET BY MOUTH DAILY, Disp: 90 tablet, Rfl: 0    divalproex sodium (DEPAKOTE) 500 mg DR tablet, , Disp: , Rfl:     lithium carbonate 300 mg capsule, , Disp: , Rfl:     Current Allergies     Allergies as of 05/25/2024    (No Known Allergies)            The following portions of the patient's history were reviewed and updated as appropriate: allergies, current medications, past family history, past medical history, past social history, past surgical history and problem list.     Past Medical History:   Diagnosis Date    Anxiety     Bipolar 1 disorder (HCC)     Depression     PTSD (post-traumatic stress disorder)        History reviewed. No pertinent surgical history.    Family History   Problem Relation Age of Onset    Cancer Mother         Oral    No Known Problems Sister     No Known Problems  "Daughter          Medications have been verified.        Objective   /72   Pulse 80   Temp 98 °F (36.7 °C)   Resp 18   Ht 5' 10\" (1.778 m)   Wt (!) 149 kg (329 lb)   SpO2 98%   BMI 47.21 kg/m²   No LMP for male patient.       Physical Exam     Physical Exam  Constitutional:       General: He is not in acute distress.     Appearance: Normal appearance. He is not ill-appearing.   HENT:      Head: Normocephalic and atraumatic.      Right Ear: A middle ear effusion is present. Tympanic membrane is injected.      Left Ear: A middle ear effusion is present. Tympanic membrane is injected.      Nose: Mucosal edema and congestion present. No rhinorrhea.      Mouth/Throat:      Lips: Pink.      Pharynx: Oropharynx is clear.   Cardiovascular:      Rate and Rhythm: Normal rate and regular rhythm.      Heart sounds: Normal heart sounds, S1 normal and S2 normal. No murmur heard.  Pulmonary:      Effort: Pulmonary effort is normal.      Breath sounds: Normal breath sounds and air entry.   Neurological:      Mental Status: He is alert.                   " Ear Star Wedge Flap Text: The defect edges were debeveled with a #15 blade scalpel.  Given the location of the defect and the proximity to free margins (helical rim) an ear star wedge flap was deemed most appropriate. Using a sterile surgical marker, the appropriate flap was drawn incorporating the defect and placing the expected incisions between the helical rim and antihelix where possible.  The area thus outlined was incised through and through with a #15 scalpel blade. Following this, the designed flap was placed in the primary defect and sutured into place.

## 2024-06-14 ENCOUNTER — APPOINTMENT (OUTPATIENT)
Dept: LAB | Facility: MEDICAL CENTER | Age: 34
End: 2024-06-14
Payer: COMMERCIAL

## 2024-06-14 DIAGNOSIS — F43.12 PROLONGED POSTTRAUMATIC STRESS DISORDER: ICD-10-CM

## 2024-06-14 DIAGNOSIS — F31.9 BIPOLAR AFFECTIVE DISORDER, REMISSION STATUS UNSPECIFIED (HCC): ICD-10-CM

## 2024-06-14 DIAGNOSIS — D72.829 LEUKOCYTOSIS, UNSPECIFIED TYPE: ICD-10-CM

## 2024-06-14 LAB
ALBUMIN SERPL BCP-MCNC: 4.2 G/DL (ref 3.5–5)
ALP SERPL-CCNC: 43 U/L (ref 34–104)
ALT SERPL W P-5'-P-CCNC: 32 U/L (ref 7–52)
ANION GAP SERPL CALCULATED.3IONS-SCNC: 9 MMOL/L (ref 4–13)
AST SERPL W P-5'-P-CCNC: 22 U/L (ref 13–39)
BASOPHILS # BLD AUTO: 0.06 THOUSANDS/ÂΜL (ref 0–0.1)
BASOPHILS NFR BLD AUTO: 1 % (ref 0–1)
BILIRUB SERPL-MCNC: 0.56 MG/DL (ref 0.2–1)
BUN SERPL-MCNC: 7 MG/DL (ref 5–25)
CALCIUM SERPL-MCNC: 9.1 MG/DL (ref 8.4–10.2)
CHLORIDE SERPL-SCNC: 102 MMOL/L (ref 96–108)
CO2 SERPL-SCNC: 29 MMOL/L (ref 21–32)
CREAT SERPL-MCNC: 1.01 MG/DL (ref 0.6–1.3)
EOSINOPHIL # BLD AUTO: 0.18 THOUSAND/ÂΜL (ref 0–0.61)
EOSINOPHIL NFR BLD AUTO: 3 % (ref 0–6)
ERYTHROCYTE [DISTWIDTH] IN BLOOD BY AUTOMATED COUNT: 12.9 % (ref 11.6–15.1)
GFR SERPL CREATININE-BSD FRML MDRD: 96 ML/MIN/1.73SQ M
GLUCOSE P FAST SERPL-MCNC: 87 MG/DL (ref 65–99)
HCT VFR BLD AUTO: 42.6 % (ref 36.5–49.3)
HGB BLD-MCNC: 13.8 G/DL (ref 12–17)
IMM GRANULOCYTES # BLD AUTO: 0.04 THOUSAND/UL (ref 0–0.2)
IMM GRANULOCYTES NFR BLD AUTO: 1 % (ref 0–2)
LITHIUM SERPL-SCNC: 0.77 MMOL/L (ref 0.6–1.2)
LYMPHOCYTES # BLD AUTO: 2.01 THOUSANDS/ÂΜL (ref 0.6–4.47)
LYMPHOCYTES NFR BLD AUTO: 35 % (ref 14–44)
MCH RBC QN AUTO: 29.8 PG (ref 26.8–34.3)
MCHC RBC AUTO-ENTMCNC: 32.4 G/DL (ref 31.4–37.4)
MCV RBC AUTO: 92 FL (ref 82–98)
MONOCYTES # BLD AUTO: 0.53 THOUSAND/ÂΜL (ref 0.17–1.22)
MONOCYTES NFR BLD AUTO: 9 % (ref 4–12)
NEUTROPHILS # BLD AUTO: 2.96 THOUSANDS/ÂΜL (ref 1.85–7.62)
NEUTS SEG NFR BLD AUTO: 51 % (ref 43–75)
NRBC BLD AUTO-RTO: 0 /100 WBCS
PLATELET # BLD AUTO: 178 THOUSANDS/UL (ref 149–390)
PMV BLD AUTO: 12.2 FL (ref 8.9–12.7)
POTASSIUM SERPL-SCNC: 4.6 MMOL/L (ref 3.5–5.3)
PROT SERPL-MCNC: 6.4 G/DL (ref 6.4–8.4)
RBC # BLD AUTO: 4.63 MILLION/UL (ref 3.88–5.62)
SODIUM SERPL-SCNC: 140 MMOL/L (ref 135–147)
VALPROATE SERPL-MCNC: 102 UG/ML (ref 50–100)
WBC # BLD AUTO: 5.78 THOUSAND/UL (ref 4.31–10.16)

## 2024-06-14 PROCEDURE — 80164 ASSAY DIPROPYLACETIC ACD TOT: CPT

## 2024-06-14 PROCEDURE — 80178 ASSAY OF LITHIUM: CPT

## 2024-06-14 PROCEDURE — 80053 COMPREHEN METABOLIC PANEL: CPT

## 2024-06-14 PROCEDURE — 36415 COLL VENOUS BLD VENIPUNCTURE: CPT

## 2024-06-14 PROCEDURE — 85025 COMPLETE CBC W/AUTO DIFF WBC: CPT

## 2024-07-16 DIAGNOSIS — E78.2 MIXED HYPERLIPIDEMIA: ICD-10-CM

## 2024-07-17 RX ORDER — ROSUVASTATIN CALCIUM 5 MG/1
5 TABLET, COATED ORAL DAILY
Qty: 100 TABLET | Refills: 1 | Status: SHIPPED | OUTPATIENT
Start: 2024-07-17

## 2024-07-25 DIAGNOSIS — M1A.9XX0 CHRONIC GOUT INVOLVING TOE OF RIGHT FOOT WITHOUT TOPHUS, UNSPECIFIED CAUSE: ICD-10-CM

## 2024-07-26 RX ORDER — ALLOPURINOL 300 MG/1
300 TABLET ORAL DAILY
Qty: 90 TABLET | Refills: 1 | Status: SHIPPED | OUTPATIENT
Start: 2024-07-26

## 2024-09-05 ENCOUNTER — OFFICE VISIT (OUTPATIENT)
Dept: FAMILY MEDICINE CLINIC | Facility: MEDICAL CENTER | Age: 34
End: 2024-09-05
Payer: COMMERCIAL

## 2024-09-05 VITALS
HEART RATE: 71 BPM | TEMPERATURE: 97.7 F | DIASTOLIC BLOOD PRESSURE: 84 MMHG | SYSTOLIC BLOOD PRESSURE: 128 MMHG | HEIGHT: 70 IN | RESPIRATION RATE: 15 BRPM | BODY MASS INDEX: 45.1 KG/M2 | OXYGEN SATURATION: 99 % | WEIGHT: 315 LBS

## 2024-09-05 DIAGNOSIS — R29.818 EXTRAPYRAMIDAL SYMPTOM: Primary | ICD-10-CM

## 2024-09-05 PROCEDURE — 99214 OFFICE O/P EST MOD 30 MIN: CPT

## 2024-09-05 NOTE — PROGRESS NOTES
Assessment/Plan:    Return for annual wellness exam at earliest convenience.  Follow up with psychiatry.      1. Extrapyramidal symptom  Symptoms appear to be related to medication Depakote.   Advised patient to follow up with his psychiatrist and further discuss weaning off of this medication.      Subjective:      Patient ID: Mack Cortes is a 34 y.o. male.    34 year old male presents with complaint of tremors in arms and hands bilaterally as well as head ongoing since starting Depakote and Lithium.  He thinks this may be related to his Depakote because after he had an increase in his dose approx. 2 months ago, the tremors significantly worsened.  Now, over the past 1 month he decreased his dose back down and noticed tremors have improved significantly, they are still present but now more mild.  He also reports some associated stiffness in his joints for the same amount of time.   He follows with Dr. Cross ([facility]) for psychiatry. Next appointment 9/19 in person. Follows monthly via phone for med checks. Never been seen in person, always virtual in the past.  No other concerns at this time.        The following portions of the patient's history were reviewed and updated as appropriate: allergies, past family history, past medical history, past social history, past surgical history, and problem list.    Review of Systems   Constitutional: Negative.    HENT: Negative.     Eyes: Negative.    Respiratory: Negative.     Cardiovascular: Negative.    Gastrointestinal: Negative.    Endocrine: Negative.    Genitourinary: Negative.    Musculoskeletal: Negative.    Skin: Negative.    Allergic/Immunologic: Negative.    Neurological:  Positive for tremors. Negative for seizures and headaches.   Hematological: Negative.    Psychiatric/Behavioral: Negative.           Objective:      /84 (BP Location: Left arm, Patient Position: Sitting, Cuff Size: Large)   Pulse 71   Temp 97.7 °F (36.5 °C) (Temporal)   Resp 15   " Ht 5' 10\" (1.778 m)   Wt (!) 150 kg (329 lb 12.8 oz)   SpO2 99%   BMI 47.32 kg/m²          Physical Exam  Vitals and nursing note reviewed.   Constitutional:       General: He is not in acute distress.     Appearance: Normal appearance. He is obese. He is not ill-appearing.   HENT:      Head: Normocephalic and atraumatic.   Cardiovascular:      Rate and Rhythm: Normal rate and regular rhythm.      Pulses: Normal pulses.      Heart sounds: Normal heart sounds.   Pulmonary:      Effort: Pulmonary effort is normal.      Breath sounds: Normal breath sounds.   Skin:     General: Skin is warm and dry.   Neurological:      General: No focal deficit present.      Mental Status: He is alert and oriented to person, place, and time. Mental status is at baseline.      GCS: GCS eye subscore is 4. GCS verbal subscore is 5. GCS motor subscore is 6.      Sensory: Sensation is intact.      Motor: Tremor (head, bilateral arms and hands at rest) present. No weakness or seizure activity.      Gait: Gait is intact.   Psychiatric:         Attention and Perception: Attention normal.         Mood and Affect: Mood normal.         Speech: Speech normal.         Behavior: Behavior normal. Behavior is cooperative.         Thought Content: Thought content normal.                    LATONIA Al  "

## 2024-10-02 ENCOUNTER — TELEPHONE (OUTPATIENT)
Dept: FAMILY MEDICINE CLINIC | Facility: MEDICAL CENTER | Age: 34
End: 2024-10-02

## 2024-10-02 NOTE — TELEPHONE ENCOUNTER
Unable to LVM,mailbox full. Sent a message via [employer] to reschedule due to Provider being out sick

## 2024-10-11 ENCOUNTER — OFFICE VISIT (OUTPATIENT)
Dept: FAMILY MEDICINE CLINIC | Facility: MEDICAL CENTER | Age: 34
End: 2024-10-11
Payer: COMMERCIAL

## 2024-10-11 VITALS
DIASTOLIC BLOOD PRESSURE: 76 MMHG | HEIGHT: 70 IN | OXYGEN SATURATION: 96 % | TEMPERATURE: 99 F | WEIGHT: 315 LBS | BODY MASS INDEX: 45.1 KG/M2 | SYSTOLIC BLOOD PRESSURE: 122 MMHG | HEART RATE: 84 BPM

## 2024-10-11 DIAGNOSIS — Z23 ENCOUNTER FOR IMMUNIZATION: ICD-10-CM

## 2024-10-11 DIAGNOSIS — E78.2 MIXED HYPERLIPIDEMIA: ICD-10-CM

## 2024-10-11 DIAGNOSIS — F31.9 SEVERE BIPOLAR I DISORDER (HCC): ICD-10-CM

## 2024-10-11 DIAGNOSIS — Z23 NEED FOR COVID-19 VACCINE: ICD-10-CM

## 2024-10-11 DIAGNOSIS — E66.01 CLASS 3 SEVERE OBESITY DUE TO EXCESS CALORIES WITHOUT SERIOUS COMORBIDITY WITH BODY MASS INDEX (BMI) OF 45.0 TO 49.9 IN ADULT (HCC): Primary | ICD-10-CM

## 2024-10-11 DIAGNOSIS — E66.813 CLASS 3 SEVERE OBESITY DUE TO EXCESS CALORIES WITHOUT SERIOUS COMORBIDITY WITH BODY MASS INDEX (BMI) OF 45.0 TO 49.9 IN ADULT (HCC): Primary | ICD-10-CM

## 2024-10-11 DIAGNOSIS — E03.9 HYPOTHYROIDISM, UNSPECIFIED TYPE: ICD-10-CM

## 2024-10-11 PROCEDURE — 91320 SARSCV2 VAC 30MCG TRS-SUC IM: CPT

## 2024-10-11 PROCEDURE — G0008 ADMIN INFLUENZA VIRUS VAC: HCPCS

## 2024-10-11 PROCEDURE — 90656 IIV3 VACC NO PRSV 0.5 ML IM: CPT

## 2024-10-11 PROCEDURE — 99214 OFFICE O/P EST MOD 30 MIN: CPT

## 2024-10-11 PROCEDURE — 90480 ADMN SARSCOV2 VAC 1/ONLY CMP: CPT

## 2024-10-11 NOTE — PROGRESS NOTES
Assessment/Plan:    Influenza and COVID vaccines updated today.  Referral placed to weight management.  Return for AWV with fasting labs due prior to visit.     1. Class 3 severe obesity due to excess calories without serious comorbidity with body mass index (BMI) of 45.0 to 49.9 in adult (Formerly [facility])  Encouraged patient to make dietary changes, exercise regularly.  Follow-up with weight management to further discuss weight loss medication options.  - Ambulatory Referral to Weight Management; Future    2. Severe bipolar I disorder (Formerly [facility])  Followed by psychiatry.  Currently on Risperdal, lithium and Depakote which he is currently weaning off of.    3. Hypothyroidism, unspecified type  Continue current dose of levothyroxine.  Recheck TFTs in 3 months.  - TSH, 3rd generation with Free T4 reflex; Future    4. Mixed hyperlipidemia  Continue rosuvastatin recheck lipid panel and CMP in 3 months..    - Lipid panel; Future  - Comprehensive metabolic panel; Future    5. Need for COVID-19 vaccine  - COVID-19 Pfizer mRNA vaccine 12 yr and older (Comirnaty pre-filled syringe)    6. Encounter for immunization  - influenza vaccine preservative-free 0.5 mL IM (Fluzone, Afluria, Fluarix, Flulaval)      Subjective:      Patient ID: Mack Cortes is a 34 y.o. male.    34 year old male presents to discuss weight loss medications.  He tells me he was able to lose 40 lbs in the past when he was following a healthy diet and seeing weight management but he has since gained it all back.   He is interested in possibly starting a GLP-1 per his psychiatrists recommendation.   He is not currently following a healthy diet or exercising much other than sometimes walking and going on hikes. He tells me it is difficult to do so because of his mood problems with his bipolar diagnosis. He is in the process of weaning off of his Depakote at this time with his psychiatrist as this caused EPS.   He has been on several diets since seeing [facility] and has been  "successful in losing weight when motivated and following healthy diet but it is difficult for him to stick to it.              The following portions of the patient's history were reviewed and updated as appropriate: allergies, past family history, past medical history, past social history, past surgical history, and problem list.    Review of Systems   Constitutional: Negative.    HENT: Negative.     Eyes: Negative.    Respiratory: Negative.     Cardiovascular: Negative.    Gastrointestinal: Negative.    Endocrine: Negative.    Genitourinary: Negative.    Musculoskeletal: Negative.    Skin: Negative.    Allergic/Immunologic: Negative.    Neurological: Negative.    Hematological: Negative.    Psychiatric/Behavioral: Negative.           Objective:      /76 (BP Location: Left arm, Patient Position: Sitting, Cuff Size: Large)   Pulse 84   Temp 99 °F (37.2 °C) (Temporal)   Ht 5' 10\" (1.778 m)   Wt (!) 147 kg (325 lb)   SpO2 96%   BMI 46.63 kg/m²          Physical Exam  Vitals and nursing note reviewed.   Constitutional:       General: He is not in acute distress.     Appearance: Normal appearance. He is obese. He is not ill-appearing.   HENT:      Head: Normocephalic and atraumatic.   Cardiovascular:      Rate and Rhythm: Normal rate and regular rhythm.      Pulses: Normal pulses.      Heart sounds: Normal heart sounds.   Pulmonary:      Effort: Pulmonary effort is normal.      Breath sounds: Normal breath sounds.   Skin:     General: Skin is warm and dry.   Neurological:      General: No focal deficit present.      Mental Status: He is alert and oriented to person, place, and time. Mental status is at baseline.   Psychiatric:         Mood and Affect: Mood normal.         Behavior: Behavior normal.                    LATONIA Al  "

## 2024-10-14 ENCOUNTER — TELEPHONE (OUTPATIENT)
Dept: BARIATRICS | Facility: CLINIC | Age: 34
End: 2024-10-14

## 2024-10-14 ENCOUNTER — OFFICE VISIT (OUTPATIENT)
Dept: BARIATRICS | Facility: CLINIC | Age: 34
End: 2024-10-14
Payer: COMMERCIAL

## 2024-10-14 VITALS
HEIGHT: 71 IN | BODY MASS INDEX: 44.1 KG/M2 | WEIGHT: 315 LBS | DIASTOLIC BLOOD PRESSURE: 78 MMHG | SYSTOLIC BLOOD PRESSURE: 128 MMHG | HEART RATE: 81 BPM | OXYGEN SATURATION: 96 %

## 2024-10-14 DIAGNOSIS — E66.813 CLASS 3 SEVERE OBESITY DUE TO EXCESS CALORIES WITHOUT SERIOUS COMORBIDITY WITH BODY MASS INDEX (BMI) OF 45.0 TO 49.9 IN ADULT (HCC): ICD-10-CM

## 2024-10-14 DIAGNOSIS — E66.01 CLASS 3 SEVERE OBESITY DUE TO EXCESS CALORIES WITHOUT SERIOUS COMORBIDITY WITH BODY MASS INDEX (BMI) OF 45.0 TO 49.9 IN ADULT (HCC): ICD-10-CM

## 2024-10-14 PROCEDURE — 99204 OFFICE O/P NEW MOD 45 MIN: CPT | Performed by: INTERNAL MEDICINE

## 2024-10-14 RX ORDER — DIVALPROEX SODIUM 250 MG/1
TABLET, DELAYED RELEASE ORAL
COMMUNITY
Start: 2024-09-19

## 2024-10-14 RX ORDER — METFORMIN HYDROCHLORIDE 500 MG/1
500 TABLET, EXTENDED RELEASE ORAL 2 TIMES DAILY WITH MEALS
Qty: 60 TABLET | Refills: 3 | Status: SHIPPED | OUTPATIENT
Start: 2024-10-14

## 2024-10-14 NOTE — PROGRESS NOTES
Assessment/Plan     Mack Cortes is 34 y.o. year old male  who comes in for consultation for assistance with weight management.     - Discussed options of HealthyCORE-Intensive Lifestyle Intervention Program, Very Low Calorie Diet-VLCD, and Conservative Program and the role of weight loss medications.  - Patient is interested in pursuing Conservative Program    Class 3 severe obesity due to excess calories without serious comorbidity with body mass index (BMI) of 45.0 to 49.9 in adult (HCC)  -Reviewed diet recall with the patient and encouraged he cut back sweet tea to half a gallon a day  -Avoid skipping meals in order to not slow down metabolic rate and could incorporate meal replacement protein shakes for supplementation  -Encouraged him to set designated time and nothing to eat past a certain time in the evening.  Encouraged him to sit at the table without electronics and chew 30 times as a behavioral strategy to incorporate mindfulness while eating if he does feel like eating towards the evening  -Patient will work with the dietitian through the meal planning bundles to help balance his nutrition while he is on injectable medication as below.  -Consider either resistance bands of free weights at home 2 to 3 days a week; continue walks 3 miles 2-3 times a week  -Reviewed with patient that medications such as Depakote and risperidone could be weight promoting patient reports he is in the process of tapering Depakote  -Given patient's weight loss goals and severe class III obesity we will attempt prescription for Wegovy   Topamax contraindicated due to prior history of kidney stones however patient is under the care of a psychiatrist so would like  Obtain approval with use of either phentermine bupropion naltrexone   -STOP-BANG's 4/8 will consider home sleep study in the future    Mack was seen today for consult.    Diagnoses and all orders for this visit:    Class 3 severe obesity due to excess calories  "without serious comorbidity with body mass index (BMI) of 45.0 to 49.9 in adult (HCC)  -     Ambulatory Referral to Weight Management  -     Semaglutide-Weight Management (WEGOVY) 0.25 MG/0.5ML; Inject 0.5 mL (0.25 mg total) under the skin once a week  -     metFORMIN (GLUCOPHAGE-XR) 500 mg 24 hr tablet; Take 1 tablet (500 mg total) by mouth 2 (two) times a day with meals          -In addition, please follow general recommendations below.          Return visit:  6-8 weeks        General Lifestyle recommendations:    Nutrition   -Avoid skipping meals. Avoid sugary beverages. At least 64oz of water daily.  Limit processed food, refined sugars and grain. Encourage  healthy choices for meals and snacks   -Focus on protein goals and non starchy fiber rich vegetables for satiety effect and to help support a calorie deficit.   - Emphasize portion control, well balanced macronutrient's (protein, carbohydrate, fat using MyPlate method )and adequate protein with each meal/snacks and distributing calories equally throughout the day along with.   -Advise starting the day with a protein breakfast   Behavioral/Stress   Food log via betzy or provided paper log (betzy options include www.[employer].com, sparkpeople.com, loseit.com, calorieking.com, [employer]). Encouraged mindful eating. Be sure to set aside time to eat, eat slowly, and savor your food. Consider meditation apps and/or taking a few minutes of mindfulness every AM. Understand the role of regarding the role of stress hormone cortisol in promoting weight gain and visceral fat accumulation. Weigh daily or atleast 2-3 times/ week  Physical Activity   Increase physical activity by 10 minutes daily. Gradually increase physical activity to a goal of 5 days per week for 30 minutes of MODERATE intensity ( should be able to pass the \"talk test\" but should not be able to sing. Target 150-300 minutes  PLUS 2 days per week of FULL BODY resistance training. Progression will be " addressed at follow up visits. Encouraged contemplation regarding establishing a daily physical activity routine  - Resistance training along with increase protein intake is important to maintain and enhance metabolism  Sleep   Encourage sleep hygiene and importance of having adequate sleep duration at least > 6 hours to support response in weight loss efforts    Handouts provided :  TH[facility]IVE program at [facility]  MyPlate and food quality  Food log resources, phone betzy or paper journal  Antiobesity medications options     - Discussed at length and the role of weight loss medications and medication options   - Explained the importance of making lifestyle changes in addition to starting any anti-obesity medications if the  patient chooses.  -  Initial weight loss goal of 5-10% weight loss for improved health  - Weight loss can improve patient's co-morbid conditions and/or prevent weight-related complications.  - Weight is not at goal and patient has been unable to achieve a meaningful weight loss above 5% using various programs and tools for more than 6 months    Mack was seen today for consult.    Diagnoses and all orders for this visit:    Class 3 severe obesity due to excess calories without serious comorbidity with body mass index (BMI) of 45.0 to 49.9 in adult (HCC)  -     Ambulatory Referral to Weight Management  -     Semaglutide-Weight Management (WEGOVY) 0.25 MG/0.5ML; Inject 0.5 mL (0.25 mg total) under the skin once a week  -     metFORMIN (GLUCOPHAGE-XR) 500 mg 24 hr tablet; Take 1 tablet (500 mg total) by mouth 2 (two) times a day with meals              Metformin instructions     cautions: Nausea and diarrhea are the most common side effects which may improve in 1 to 2 weeks. Hypoglycemia may occur with insufficient calorie intake, strenuous exercise, or concomitant use of hypoglycemic agents or ethanol; increased risk in elderly.   Low vitamin B12 levels may occur; monitoring recommended.   Recommended supplementation with a good-quality over-the-counter B complex vitamin.  B12 levels will be checked yearly and additional supplementation recommended if needed.    Adverse effects   Cobalamin deficiency (7% to 17.4% )  Gastrointestinal: Diarrhea (53% (immediate-release) ; 10% to 13% (extended-release) ), Flatulence , Indigestion, Malabsorption syndrome, Nausea (7% (extended-release) ), Vomiting (Up to 25.5% )     Wegovy Instructions:    - Begin Wegovy 0.25 mg subcutaneously once a week. Dose changes may occur after 4 doses if medication is tolerated. You will be assessed prior to each dose change to make sure you are tolerating the medication well.  - Please message me when you have 2 pens left from the prescription so there are no lapses in treatment.  - Visit wegovy.com for further information/injection instructions.   -Please eat small frequent, low fat meals to help reduce nausea. Lemon water and saltine crackers may help with this. Avoid fried foods  - Try to stop eating before you feel full  - Side effects of Wegovy discussed: nausea, vomiting, diarrhea, and constipation. If you experience fever, nausea/vomiting, and pain radiating to your back this may be a sign of pancreatitis. Please go to the emergency room if this occurs.  -- Take precautions to avoid dehydration. Aim for 64-80 oz of fluids/day  - A bowel regimen including OTC stool softeners, fiber supplements to manage constipation is suggested  - Patient understands the side effects of the medication and proper administration. Patient agrees with the treatment plan and all questions were answered.    The most common side effects of Wegovy include nausea, diarrhea, vomiting, constipation, abdominal (stomach) pain, headache, fatigue, dyspepsia (indigestion), dizziness, abdominal distension, eructation (belching), hypoglycemia (low blood sugar) in patients with type 2 diabetes treated with other drugs,  flatulence (gas buildup),  gastroenteritis (an intestinal infection) and gastroesophageal reflux disease (a type of digestive disorder).     Wegovy should not be used in patients with a personal or family history of medullary thyroid carcinoma or in patients with a rare condition called Multiple Endocrine Neoplasia syndrome type 2 (MEN 2).     Wegovy also contains warnings for inflammation of the pancreas (pancreatitis), gallbladder problems (including gallstones), low blood sugar, acute kidney injury, diabetic retinopathy (damage to the eye's retina), increased heart rate and suicidal behavior or thinking .  Patient denies personal or ffamily Hx of pancreatitis, MEN 2 tumors and medullary thyroid cancer. Patient understands these major side effects and agrees to start the medication.    Subcutaneous injection:  Inject subQ into the thigh, abdomen, or upper arm; rotate injection sites.  Administer at any time of day, with or without meals.  Administer separately from insulin (do not mix the products); may inject both medications in the same body region but not adjacent to each other.  The day of the weekly administration can be changed as long as the time between the 2 doses is at least 3 days (72 hours)  Administer a missed dose as soon as possible within 4 days (96 hours) of missed dose; if more than 4 days have passed, skip the missed dose and administer next dose on regularly scheduled day and resume regular once weekly dosing schedule           Total time spent reviewing chart, interviewing patient, examining patient, discussing plan, answering all questions, and documentin min, with >50% face-to-face time spent counseling patient on nonsurgical interventions for the treatment of excess weight. Discussed in detail nonsurgical options including intensive lifestyle intervention program, very low-calorie diet program and conservative program.  Discussed the role of weight loss medications.  Counseled patient on diet behavior and  "exercise modification for weight loss.        History of present illness/ Weight history     Patient has been in the healthy core program in 2022.  He reports he successfully lost 40 pounds but then regained it all after he went off plan.  He is here to seek help with weight regain and to consider medications.  Per patient his psychiatrist felt injectable medications were a good option given his underlying psychiatric history and other medications used for mood stabilization   Wt Readings from Last 30 Encounters:   10/14/24 (!) 144 kg (317 lb 3.2 oz)   10/11/24 (!) 147 kg (325 lb)   09/05/24 (!) 150 kg (329 lb 12.8 oz)   05/25/24 (!) 149 kg (329 lb)   05/16/24 (!) 149 kg (329 lb)   03/14/24 (!) 146 kg (320 lb 12.8 oz)   12/15/23 (!) 147 kg (323 lb 6.4 oz)   10/09/23 (!) 140 kg (309 lb)   09/25/23 (!) 140 kg (309 lb)   03/30/23 (!) 140 kg (309 lb 9.6 oz)   01/03/23 126 kg (278 lb 4.8 oz)   12/27/22 128 kg (281 lb 4.8 oz)   12/20/22 130 kg (286 lb 3.2 oz)   12/19/22 129 kg (284 lb 11.2 oz)   12/06/22 131 kg (289 lb 8 oz)   11/29/22 134 kg (295 lb 4.8 oz)   11/28/22 133 kg (292 lb 3.2 oz)   11/22/22 135 kg (296 lb 14.4 oz)   11/15/22 (!) 137 kg (302 lb 4.8 oz)   11/08/22 (!) 140 kg (309 lb 1.6 oz)   11/07/22 (!) 140 kg (309 lb 3.2 oz)   11/03/22 (!) 140 kg (309 lb 3.2 oz)   04/10/18 121 kg (266 lb)   02/27/18 125 kg (274 lb 12.8 oz)   01/30/18 128 kg (283 lb)   11/20/14 119 kg (262 lb 12.8 oz)   10/01/14 118 kg (259 lb 8 oz)   07/24/14 113 kg (249 lb)               Lifestyle questionnaire       Diet recall:  B: skips  S:  L: skips  S:  D: spaghetti and chicken breast corn  S: Chips or tasty cakes    Frequency Eating out x/ week: 3x/ week fast food such as [employer]    Food behaviors\"head\" hunger/cravings, boredom eating, and snacking/grazing-sweet cravings after dinner but tries to incorporate some of the information that he gathered with the healthy core back in 2022    Trouble area of the day: " "Evening    Beverages  Water--64  oz   Caffeine/tea--sweet iced tea a gallon a day oz   SSB --occasional soda  Alcohol: none drinks/ week   Smoking: no  Drug use: no    Physical Activity --walks 3 miles 2-3 times a week    Sleep -- STOP- BANG- 4/8 ; 7-8hours of sleep per night    Occupation-disabled    Psycho social- lives with wife who works night shifts daughter 5 years old           Start date: 10/14/2024  Intial weight : 317 lbs  Intial BMI: 45 kg/m2  Obesity Class: Above 40- Obesity Class III  Goal weight: Less than 250 lbs        Medication considerations/contraindications     -Patient denies personal history of pancreatitis. Patient also denies personal and family history of medullary thyroid cancer, and multiple endocrine neoplasia type 2 (MEN 2 tumor). -Patient denies any history of kidney stones, seizures, or glaucoma, diabetic retinopathy, gall bladder disease, gastroparesis, hyperthyroidism.  -Denies Hx of CAD, PAD, palpitations, arrhythmia, uncontrolled hypertension  -Denies uncontrolled anxiety or depression, suicidal behavior or thinking , insomnia or sleep disturbance.         Past medical history/past surgical history       Previous notes and records have been reviewed.    The following portions of the patient's history were reviewed and updated as appropriate: allergies, current medications, past family history, past medical history, past social history, past surgical history, and problem list.    Past Medical History:   Diagnosis Date    Anxiety     Bipolar 1 disorder (HCC)     Depression     PTSD (post-traumatic stress disorder)          History reviewed. No pertinent surgical history.          Family History   Problem Relation Age of Onset    Cancer Mother         Oral    No Known Problems Sister     No Known Problems Daughter             Objective     /78 (BP Location: Left arm, Patient Position: Sitting, Cuff Size: Adult)   Pulse 81   Ht 5' 10.5\" (1.791 m)   Wt (!) 144 kg (317 lb 3.2 " oz)   SpO2 96%   BMI 44.87 kg/m²       Review of Systems   Constitutional:  Negative for fatigue.   HENT:  Negative for sore throat.    Respiratory:  Negative for cough and shortness of breath.    Cardiovascular:  Negative for chest pain, palpitations and leg swelling.   Gastrointestinal:  Negative for abdominal pain, constipation, diarrhea and nausea.   Genitourinary:  Negative for dysuria.   Musculoskeletal:  Negative for arthralgias and back pain.   Skin:  Negative for rash.   Neurological:  Negative for headaches.   Psychiatric/Behavioral:  Negative for dysphoric mood. The patient is not nervous/anxious.        Physical Exam  Vitals and nursing note reviewed.   Constitutional:       Appearance: Normal appearance.   HENT:      Head: Normocephalic.   Pulmonary:      Effort: Pulmonary effort is normal.   Neurological:      General: No focal deficit present.      Mental Status: He is alert and oriented to person, place, and time.   Psychiatric:         Mood and Affect: Mood normal.         Behavior: Behavior normal.         Thought Content: Thought content normal.         Judgment: Judgment normal.            Medications       Current Outpatient Medications:     allopurinol (ZYLOPRIM) 300 mg tablet, TAKE ONE TABLET BY MOUTH EVERY DAY, Disp: 90 tablet, Rfl: 1    divalproex sodium (DEPAKOTE) 250 mg DR tablet, , Disp: , Rfl:     levothyroxine 75 mcg tablet, Take 1 tablet (75 mcg total) by mouth daily, Disp: 90 tablet, Rfl: 1    lithium 600 MG capsule, Take 600 mg by mouth 2 (two) times a day with meals, Disp: , Rfl:     metFORMIN (GLUCOPHAGE-XR) 500 mg 24 hr tablet, Take 1 tablet (500 mg total) by mouth 2 (two) times a day with meals, Disp: 60 tablet, Rfl: 3    risperiDONE (RisperDAL) 1 mg tablet, Take 1 mg by mouth daily, Disp: , Rfl:     rosuvastatin (CRESTOR) 5 mg tablet, TAKE ONE TABLET BY MOUTH DAILY, Disp: 100 tablet, Rfl: 1    Semaglutide-Weight Management (WEGOVY) 0.25 MG/0.5ML, Inject 0.5 mL (0.25 mg total)  under the skin once a week, Disp: 2 mL, Rfl: 0           Labs and imaging     Recent labs and imaging have been personally reviewed.  Lab Results   Component Value Date    WBC 5.78 06/14/2024    HGB 13.8 06/14/2024    HCT 42.6 06/14/2024    MCV 92 06/14/2024     06/14/2024     Lab Results   Component Value Date    SODIUM 140 06/14/2024    K 4.6 06/14/2024     06/14/2024    CO2 29 06/14/2024    AGAP 9 06/14/2024    BUN 7 06/14/2024    CREATININE 1.01 06/14/2024    GLUC 168 (H) 12/12/2023    GLUF 87 06/14/2024    CALCIUM 9.1 06/14/2024    AST 22 06/14/2024    ALT 32 06/14/2024    ALKPHOS 43 06/14/2024    TP 6.4 06/14/2024    TBILI 0.56 06/14/2024    EGFR 96 06/14/2024     Lab Results   Component Value Date    HGBA1C 5.4 01/03/2024     Lab Results   Component Value Date    ORU7IPVONFJB 4.438 06/14/2024    TSH 3.77 (H) 08/06/2020     Lab Results   Component Value Date    CHOLESTEROL 145 01/03/2024     Lab Results   Component Value Date    HDL 32 (L) 01/03/2024     Lab Results   Component Value Date    TRIG 111 01/03/2024     Lab Results   Component Value Date    LDLCALC 91 01/03/2024

## 2024-10-14 NOTE — ASSESSMENT & PLAN NOTE
-Reviewed diet recall with the patient and encouraged he cut back sweet tea to half a gallon a day  -Avoid skipping meals in order to not slow down metabolic rate and could incorporate meal replacement protein shakes for supplementation  -Encouraged him to set designated time and nothing to eat past a certain time in the evening.  Encouraged him to sit at the table without electronics and chew 30 times as a behavioral strategy to incorporate mindfulness while eating if he does feel like eating towards the evening  -Patient will work with the dietitian through the meal planning bundles to help balance his nutrition while he is on injectable medication as below.  -Consider either resistance bands of free weights at home 2 to 3 days a week; continue walks 3 miles 2-3 times a week  -Reviewed with patient that medications such as Depakote and risperidone could be weight promoting patient reports he is in the process of tapering Depakote  -Given patient's weight loss goals and severe class III obesity we will attempt prescription for Wegovy   Topamax contraindicated due to prior history of kidney stones however patient is under the care of a psychiatrist so would like  Obtain approval with use of either phentermine bupropion naltrexone   -STOP-BANG's 4/8 will consider home sleep study in the future

## 2024-10-18 NOTE — TELEPHONE ENCOUNTER
Wegovy Approved 10/14/24-10/14/25.  Pharmacy notified and they're getting medication ready for patient.  Patient informed via Mychart.

## 2024-11-01 ENCOUNTER — TELEPHONE (OUTPATIENT)
Dept: BARIATRICS | Facility: CLINIC | Age: 34
End: 2024-11-01

## 2024-11-01 ENCOUNTER — CLINICAL SUPPORT (OUTPATIENT)
Dept: BARIATRICS | Facility: CLINIC | Age: 34
End: 2024-11-01

## 2024-11-01 VITALS — BODY MASS INDEX: 43.68 KG/M2 | WEIGHT: 312 LBS | HEIGHT: 71 IN

## 2024-11-01 DIAGNOSIS — E66.01 OBESITY, CLASS III, BMI 40-49.9 (MORBID OBESITY) (HCC): Primary | ICD-10-CM

## 2024-11-01 DIAGNOSIS — E66.01 MORBID OBESITY WITH BODY MASS INDEX OF 40.0-44.9 IN ADULT (HCC): Primary | ICD-10-CM

## 2024-11-01 PROCEDURE — S9470 NUTRITIONAL COUNSELING, DIET: HCPCS

## 2024-11-01 PROCEDURE — RECHECK

## 2024-11-01 NOTE — PROGRESS NOTES
"Weight Management Medical Nutrition Assessment  Pt is here today for menu planning. Current weight 312#. Pt reports he has struggled with his weight for awhile. He did the healthy core program in 2022 and did well, but stuck to the meal plan eating the same thing everyday for the 3 months. He reports it was working so he didn't want to stray but since he ended the program and started trying to add in different foods he fell off track. He reports this time around he doesn't want to be as restrictive with himself. He is currently on the Wegovy and metformin and finds that his appetite has been curbed. He is snacking less and most times not eating what he is serving himself. He is very aware of the need for protein, has been measuring and weighing his foods and is logging in [employer].  He is working on aiming for about 1900 calories per day, but most days may not be meeting those needs. Today we reviewed his food log noting lack of non-starchy veggies. He reports his wife and 5 year old are not big on veggies so they are not made as often. He did not want to come up with a structured meal plan of example meals because he feels he might fall into the same pattern as before. Showed pt the multiple menu options that fit into his calorie needs of 500 cals per meal with 2 planned 200 jaden snacks. Provided with handouts for reference.  Pt will f/u in one month with RD.    Patient seen by Medical Provider in past 6 months:  yes  Requested to schedule appointment with Medical Provider: No    Anthropometric Measurements  Provider Start Weight (#): 317# (10/14/24)   (was 309 in Nov 2022 when was here with medical)  Current Weight (#): 312#  Net wt change: -5.2# from 10/17/24  TBW % Change from start weight: 1.6%  IBW: 169# (70.5\")  Goal Weight (#): LTG: -100lbs    Weight Loss History  Previous weight loss attempts: Exercise  Self Created Diets (Portion Control, Healthy Food Choices, etc.)  Healthy Core    Food and Nutrition " Related History  Wake up: 6am     Dietary Recall  Breakfast: Oiko Pro 20 + fruit (2 clementines or banana) + Fairlife Core Elite (42gm)    Snack: 2hrs after breakfast-fruit  Lunch: 4-6oz Rotisserie chicken and adds to a salad kit (3 cups) OR protein shake if didn't have for breakfast  Snack: -  Dinner: this varies the most-will make is work by looking in myfitnesspal to what he has left for his day and makes it fit with the portions. Ie: breaded pork chops OR vilma  + corn/peas + mashed potatoes OR damon broil.  He likes veggies but his wife and 5 year don't so aren't made as often.  Used to by a lot of fast food. Only thing that might get fast food is chick loki-a salad  Snack: -    Beverages: no coffee, only drinking water now. Cut out soda and iced tea  Volume of beverage intake: 120oz water    Weekends: same  Cravings: since the wegovy no cravings.   Trouble area of day: not at this time.     Frequency of Eating out: 1x per week-being more mindful of choices.   Food restrictions: none  Cooking: pt does  Food Shopping: both do food shopping    Occupation: he is disabled, wife works for [employer] (works night shift so sleeping a lot in the day so he does the cooking)    Physical Activity  Activity: walks 3 miles, wants to increase to 4 miles per day. Has been doing this walk for awhile now  Frequency: 3-4 days per week,   Physical limitations/barriers to exercise: -    Estimated Needs  [facility] Energy Needs (needs at 312#)  BMR: 2364 kcal  Maintenance calories (sedentary): 2837 kcal  1-2#/week loss (sedentary): 5398-2263 kcal  1-2#/week loss (light activity): 6000-4865 kcal    Protein:  grams (1.2-1.5g/kg IBW)  Fluid: 2688 ml (35mL/kg IBW)    Nutrition Diagnosis  Yes;    Overweight/obesity  related to Excess energy intake as evidenced by  BMI more than normative standard for age and sex (obesity-grade III 40+)       Nutrition Intervention    Nutrition Prescription  Calories: 1461-4688  kcal  Protein:  g  Fluid: 2800 ml    Meal Plan (Romeo/Pro)  Breakfast: 500  Snack: 200  Lunch: 500  Snack: 200  Dinner: 500  Snack: optional 100 romeo    Nutrition Education  Calorie controlled menu  Lean protein food choices  Healthy snack options  Food journaling tips    Nutrition Counseling  Strategies: meal planning, portion sizes, healthy snack choices, hydration, fiber intake, protein intake, exercise, food logging    Monitoring and Evaluation:    Evaluation criteria  Energy Intake  Meet protein needs  Maintain adequate hydration  Monitor weekly weight  Meal planning/preparation  Food journal   Decreased portions at mealtimes and snacks  Physical activity     Barriers to change:none  Readiness to change: Action:  (Changing behavior)  Comprehension: good  Expected Compliance: good

## 2024-11-01 NOTE — TELEPHONE ENCOUNTER
Pt saw Jacklyn and stat ed the he is tolerating the Wegovy 0.25mg with mild nausea but manageable. He would like to increase if possible he has taken his 3 injection.

## 2024-11-04 ENCOUNTER — TELEPHONE (OUTPATIENT)
Dept: BARIATRICS | Facility: CLINIC | Age: 34
End: 2024-11-04

## 2024-11-04 NOTE — TELEPHONE ENCOUNTER
Patient would like his Wegovy rx sent to [facility] in [place], it was sent to [facility].  Thanks.

## 2024-11-04 NOTE — TELEPHONE ENCOUNTER
We received prior authorization request from pharmacy for Wegovy 0.5MG dose.  Patient has approval through [facility] through October 2025.  Contacted pharmacy they said prescription was filled through [facility] 10/19/24 and too soon to fill, also medication out of stock.   They are going to try to fill when appropriate.

## 2024-11-07 ENCOUNTER — TELEPHONE (OUTPATIENT)
Dept: BARIATRICS | Facility: CLINIC | Age: 34
End: 2024-11-07

## 2024-11-07 NOTE — TELEPHONE ENCOUNTER
Called to get the patient r/s from Dr. Lara on 1/16 , no answer cnt azam vm full. Will send a Zones message.

## 2024-12-01 DIAGNOSIS — E03.9 HYPOTHYROIDISM, UNSPECIFIED TYPE: ICD-10-CM

## 2024-12-03 ENCOUNTER — CLINICAL SUPPORT (OUTPATIENT)
Dept: BARIATRICS | Facility: CLINIC | Age: 34
End: 2024-12-03
Payer: COMMERCIAL

## 2024-12-03 VITALS — WEIGHT: 293.6 LBS | BODY MASS INDEX: 41.1 KG/M2 | HEIGHT: 71 IN

## 2024-12-03 DIAGNOSIS — E66.01 MORBID OBESITY WITH BMI OF 40.0-44.9, ADULT (HCC): ICD-10-CM

## 2024-12-03 DIAGNOSIS — R63.5 ABNORMAL WEIGHT GAIN: Primary | ICD-10-CM

## 2024-12-03 PROCEDURE — S9449 WEIGHT MGMT CLASS: HCPCS

## 2024-12-03 PROCEDURE — RECHECK

## 2024-12-03 RX ORDER — LEVOTHYROXINE SODIUM 75 UG/1
75 TABLET ORAL DAILY
Qty: 90 TABLET | Refills: 1 | Status: SHIPPED | OUTPATIENT
Start: 2024-12-03

## 2024-12-03 NOTE — PROGRESS NOTES
"Weight Management Medical Nutrition Assessment--2 of 12 SLE RD visit  Pt is here today for menu planning. Current weight 293.6# which is an 18.4# weight loss in the past month and overall at 7.4% TBW.  Pt continues to make great changes. He most often will have similar foods for breakfast and lunch but then will eat \"whatever\" there is for dinner within the calories he has left. He does admit that some of those dinner meals could be higher in fat ie: pizza, wings, etc.  Discussed some healthier options. Did review food log and pointed out to pt aspect of the betzy showing percentage breakdown of the macros (30-35% fat, 40% carbs, 25-30% protein).  He is measuring and weighing foods and feeling satisfied with more energy.    Patient seen by Medical Provider in past 6 months:  yes  Requested to schedule appointment with Medical Provider: No    Anthropometric Measurements  Provider Start Weight (#): 317# (10/14/24)   (was 309 in Nov 2022 when was here with medical)  Current Weight (#): 293.6#  Net wt change: -23.4# from 10/17/24  TBW % Change from start weight: 7.4% (from provider start wt on 10/14/24)  IBW: 169# (70.5\")  Goal Weight (#): LTG: -100lbs    Weight Loss History  Previous weight loss attempts: Exercise  Self Created Diets (Portion Control, Healthy Food Choices, etc.)  Healthy Core    Food and Nutrition Related History  Wake up: 6am     Dietary Recall  Logging between 2764-1258 cals, 120-150gm protein, likes to keep carbs below 160gm advised can go up to 190gm (40%)  Breakfast: Oiko Pro 20 + fruit (2 clementines or banana) + Fairlife Nutrition Plan (30gm)--feels has to pace his breakfast because has some nausea, but then fine the rest of the day. Uses ginger chews as needed.  Occ Premier protein cereal + 2% Fairlife milk  Snack: 2hrs after breakfast-fruit (2 mandrins or banana) + string cheese   Lunch: 5oz Rotisserie chicken/grilled chicken and adds to salad greens (3 cups) + fat free french or skinny girl " dressing   Snack: -  Dinner: Makes it fit depending on what the meal is--last night was 10 chicken wings OR lately leftovers from Thanksgiving  ie: breaded pork chops OR vilma  + corn/peas + mashed potatoes OR damon broil.  He likes veggies but his wife and 5 year don't so aren't made as often.  Used to by a lot of fast food. Only thing that might get fast food is chick loki-a salad  Snack: -    Beverages: no coffee, only drinking water now. Cut out soda and iced tea  Volume of beverage intake: 120oz water    Weekends: same  Cravings: since the wegovy no cravings.   Trouble area of day: not at this time.     Frequency of Eating out: 1x per week-being more mindful of choices.   Food restrictions: none  Cooking: pt does  Food Shopping: both do food shopping    Occupation: he is disabled, wife works for [employer] (works night shift so sleeping a lot in the day so he does the cooking)    Physical Activity  Activity: walks 3 miles,  3-4 miles per day. 10-15 push-ups per day and wants to eventually join a gym.  Frequency: 3-4 days per week,   Physical limitations/barriers to exercise: -    Estimated Needs  [employer] Needs (needs at 312#)  BMR: 2364 kcal  Maintenance calories (sedentary): 2837 kcal  1-2#/week loss (sedentary): 4759-9252 kcal  1-2#/week loss (light activity): 9034-4502 kcal    Protein:  grams (1.2-1.5g/kg IBW)  Fluid: 2688 ml (35mL/kg IBW)    Nutrition Diagnosis  Yes;    Overweight/obesity  related to Excess energy intake as evidenced by  BMI more than normative standard for age and sex (obesity-grade III 40+)       Nutrition Intervention    Nutrition Prescription  Calories: 8569-9268 kcal  Protein:  g  Fluid: 2800 ml    Meal Plan (Jaden/Pro)  Breakfast: 500  Snack: 200  Lunch: 500  Snack: 200  Dinner: 500  Snack: optional 100 jaden    Nutrition Education  Calorie controlled menu  Lean protein food choices  Healthy snack options  Food journaling tips    Nutrition  Counseling  Strategies: meal planning, portion sizes, healthy snack choices, hydration, fiber intake, protein intake, exercise, food logging    Monitoring and Evaluation:    Evaluation criteria  Energy Intake  Meet protein needs  Maintain adequate hydration  Monitor weekly weight  Meal planning/preparation  Food journal   Decreased portions at mealtimes and snacks  Physical activity     Barriers to change:none  Readiness to change: Action:  (Changing behavior)  Comprehension: good  Expected Compliance: good

## 2024-12-10 NOTE — PROGRESS NOTES
Program: Conservative Program    Assessment/Plan     Mack Cortes  is a 34 y.o. male with  returns to follow up  for treatment of excess body weight and associated risk factors/co-morbidities.     Class 3 severe obesity due to excess calories with serious comorbidity and body mass index (BMI) of 40.0 to 44.9 in adult (HCC)  Patient is doing really well on on Wegovy.  He is finished 4 weeks of the 0.25 mg and 4 weeks of the 0.5 mg and has already lost 28 pounds, congratulated patient on losing 9% of his body weight.  He is losing about 3 to 4 pounds a week.  He reports he is down 2 pant sizes.  He will titrate up to 1 mg.  1 refill will be provided.  Asked patient to check with insurance regarding coverage for repeat dose of 1 mg.  If not approved we will titrate up to 1.7 mg and subsequently to 2.4 mg.  Patient feels like his food noise has decreased.  He has stopped drinking ice tea and eating fast food.  He is ensuring that he gets about 100 250 g of protein a day.  He has met and worked with the dietitian and has been tracking using [employer].  He does not feel the temptation for sweet processed foods such as cakes.     Nutrition Prescription  Calories: 3857-2440 kcal  Protein:  g  Fluid: 2800 ml    Patient also has been starting to go to the gym and incorporating more cardio.  He states that he was waiting to get 2 to 50 pounds before initiating resistance training.  I discussed with him safely introducing strength training with slow progression 2 to 3 days a week rotating muscle groups in order to rebuild muscle.STOP- BANG- 4/8 ; 7-8hours of sleep per night    Patient has a chart diagnosis of bipolar disease under the care of psychiatrist on lithium so would like to avoid phentermine topiramate.  Would need psychiatry approval for trialing agents such as Wellbutrin and naltrexone due to potential mood changes.  Patient stopped metformin after he initiated Wegovy.  We could consider adding this on at  "follow-up visits for weight plateaus.       Most recent notes and records were reviewed.         Return visit:  2-3 months     Nutrition   Do not skip meals. Avoid sugary beverages. At least 64oz of water daily.    Behavioral/Stress   Food log via betzy or provided paper log (betzy options include www.[employer] Engineeringnesspal.com, sparkpeople.com, loseit.com, calorieking.com, Data Driven Delivery System). Encouraged mindful eating    Physical Activity  Increase physical activity by 10 minutes daily. Gradually increase physical activity to a goal of 5 days per week for 30 minutes of MODERATE intensity ( ( should be able to pass the \"talk test\" but should not be able to sing.  target 150-300 minutes  PLUS 2-3 days per week of FULL BODY resistance training. Progression will be addressed at follow up visits. Encouraged planning regarding establishing physical activity routine    Sleep  Provided sleep hygiene counseling and importance of having adequate sleep duration          Mack was seen today for follow-up.    Diagnoses and all orders for this visit:    Class 3 severe obesity due to excess calories with serious comorbidity and body mass index (BMI) of 40.0 to 44.9 in adult (HCC)                Total time spent reviewing chart, interviewing patient, examining patient, discussing plan, answering all questions, and documentin minutes with >50% face-to-face time with the patient.            Weight trajectory     Wt Readings from Last 20 Encounters:   24 131 kg (289 lb 12.8 oz)   24 133 kg (293 lb 9.6 oz)   24 (!) 142 kg (312 lb)   10/14/24 (!) 144 kg (317 lb 3.2 oz)   10/11/24 (!) 147 kg (325 lb)   24 (!) 150 kg (329 lb 12.8 oz)   24 (!) 149 kg (329 lb)   24 (!) 149 kg (329 lb)   24 (!) 146 kg (320 lb 12.8 oz)   12/15/23 (!) 147 kg (323 lb 6.4 oz)   10/09/23 (!) 140 kg (309 lb)   23 (!) 140 kg (309 lb)   23 (!) 140 kg (309 lb 9.6 oz)   23 126 kg (278 lb 4.8 oz)   22 128 kg (281 lb " "4.8 oz)   12/20/22 130 kg (286 lb 3.2 oz)   12/19/22 129 kg (284 lb 11.2 oz)   12/06/22 131 kg (289 lb 8 oz)   11/29/22 134 kg (295 lb 4.8 oz)   11/28/22 133 kg (292 lb 3.2 oz)               Lifestyle questionnaire     Nutrition Prescription  Calories: 5030-1822 kcal  Protein:  g  Fluid: 2800 ml     Meal Plan (Romeo/Pro)  Breakfast: 500  Snack: 200  Lunch: 500  Snack: 200  Dinner: 500  Snack: optional 100 romeo     Diet recall:  B: meebee cereal premier protein   S: string cheese apple   L: 5oz Rotisserie chicken/grilled chicken and adds to salad greens (3 cups) + fat free Burkinan or skinny girl dressing   S:  D: chicken nuggets   S: none      Beverages  Water--64  oz   Caffeine/tea--sweet iced tea a gallon a day oz   SSB --occasional soda  Alcohol: none drinks/ week   Smoking: no  Drug use: no    Physical Activity --walks 3 miles 2-3 times a week    Sleep -- STOP- BANG- 4/8 ; 7-8hours of sleep per night    Occupation-disabled    Psycho social- lives with wife who works night shifts for [employer] daughter 5 years old           Start date: 10/14/2024  Intial weight : 317 lbs  Intial BMI: 45 kg/m2  Obesity Class: Above 40- Obesity Class III  Goal weight: Less than 250 lbs      Weight on 12/12/2024 : 289 lbs  BMI on 12/12/2024  : 40.99 kg/m2 Above 40- Obesity Class III  Difference: -28 lbs      Anti obesity Medications/ Medical---    Weight loss medication and dose: Wegovy  Date initiated: Oct 2024                Objective         The following portions of the patient's history were reviewed and updated as appropriate: allergies, current medications, past family history, past medical history, past social history, past surgical history, and problem list.      /82 (BP Location: Right arm, Patient Position: Sitting, Cuff Size: Large)   Pulse 89   Ht 5' 10.5\" (1.791 m)   Wt 131 kg (289 lb 12.8 oz)   SpO2 98%   BMI 40.99 kg/m²             Review of Systems   Constitutional:  Negative for fatigue. "   HENT:  Negative for sore throat.    Respiratory:  Negative for cough and shortness of breath.    Cardiovascular:  Negative for chest pain, palpitations and leg swelling.   Gastrointestinal:  Negative for abdominal pain, constipation, diarrhea and nausea.   Genitourinary:  Negative for dysuria.   Musculoskeletal:  Negative for arthralgias and back pain.   Skin:  Negative for rash.   Neurological:  Negative for headaches.   Psychiatric/Behavioral:  Negative for dysphoric mood. The patient is not nervous/anxious.          Physical Exam  Vitals and nursing note reviewed.   Constitutional:       Appearance: Normal appearance.   HENT:      Head: Normocephalic.   Pulmonary:      Effort: Pulmonary effort is normal.   Neurological:      General: No focal deficit present.      Mental Status: She is alert and oriented to person, place, and time.   Psychiatric:         Mood and Affect: Mood normal.         Behavior: Behavior normal.         Thought Content: Thought content normal.         Judgment: Judgment normal.          Current medications       Current Outpatient Medications:     allopurinol (ZYLOPRIM) 300 mg tablet, TAKE ONE TABLET BY MOUTH EVERY DAY, Disp: 90 tablet, Rfl: 1    levothyroxine 75 mcg tablet, Take 1 tablet (75 mcg total) by mouth daily, Disp: 90 tablet, Rfl: 1    lithium 600 MG capsule, Take 600 mg by mouth 2 (two) times a day with meals, Disp: , Rfl:     metFORMIN (GLUCOPHAGE-XR) 500 mg 24 hr tablet, Take 1 tablet (500 mg total) by mouth 2 (two) times a day with meals, Disp: 60 tablet, Rfl: 3    risperiDONE (RisperDAL) 1 mg tablet, Take 1 mg by mouth daily, Disp: , Rfl:     rosuvastatin (CRESTOR) 5 mg tablet, TAKE ONE TABLET BY MOUTH DAILY, Disp: 100 tablet, Rfl: 1    Semaglutide-Weight Management (WEGOVY) 0.5 MG/0.5ML, Inject 0.5 mL (0.5 mg total) under the skin once a week, Disp: 2 mL, Rfl: 0    divalproex sodium (DEPAKOTE) 250 mg DR tablet, , Disp: , Rfl:     Semaglutide-Weight Management (WEGOVY) 0.25  "MG/0.5ML, Inject 0.5 mL (0.25 mg total) under the skin once a week (Patient not taking: Reported on 12/12/2024), Disp: 2 mL, Rfl: 0    Semaglutide-Weight Management (WEGOVY) 1 MG/0.5ML, Inject 0.5 mL (1 mg total) under the skin once a week Start after completing 4 weeks on 0.5 mg weekly (Patient not taking: Reported on 12/12/2024), Disp: 2 mL, Rfl: 1         Medication considerations/contraindications     -Patient denies personal history of pancreatitis. Patient also denies personal and family history of medullary thyroid cancer, and multiple endocrine neoplasia type 2 (MEN 2 tumor). -Patient denies any history of kidney stones, seizures, or glaucoma, diabetic retinopathy, gall bladder disease, gastroparesis, hyperthyroidism.  -Denies Hx of CAD, PAD, palpitations, arrhythmia, uncontrolled hypertension  -Denies uncontrolled anxiety or depression, suicidal behavior or thinking , insomnia or sleep disturbance.         Labs     Most recent labs reviewed   Lab Results   Component Value Date    SODIUM 140 06/14/2024    K 4.6 06/14/2024     06/14/2024    CO2 29 06/14/2024    AGAP 9 06/14/2024    BUN 7 06/14/2024    CREATININE 1.01 06/14/2024    GLUC 168 (H) 12/12/2023    GLUF 87 06/14/2024    CALCIUM 9.1 06/14/2024    AST 22 06/14/2024    ALT 32 06/14/2024    ALKPHOS 43 06/14/2024    TP 6.4 06/14/2024    TBILI 0.56 06/14/2024    EGFR 96 06/14/2024     Lab Results   Component Value Date    HGBA1C 5.4 01/03/2024     Lab Results   Component Value Date    YNH9PRYKSPZH 4.438 06/14/2024    TSH 3.77 (H) 08/06/2020     Lab Results   Component Value Date    CHOLESTEROL 145 01/03/2024     Lab Results   Component Value Date    HDL 32 (L) 01/03/2024     Lab Results   Component Value Date    TRIG 111 01/03/2024     Lab Results   Component Value Date    LDLCALC 91 01/03/2024     No results found for: \"VITD\"  No components found for: \"FASTINS\"  "

## 2024-12-12 ENCOUNTER — OFFICE VISIT (OUTPATIENT)
Dept: BARIATRICS | Facility: CLINIC | Age: 34
End: 2024-12-12
Payer: COMMERCIAL

## 2024-12-12 VITALS
DIASTOLIC BLOOD PRESSURE: 82 MMHG | HEART RATE: 89 BPM | BODY MASS INDEX: 40.57 KG/M2 | WEIGHT: 289.8 LBS | HEIGHT: 71 IN | OXYGEN SATURATION: 98 % | SYSTOLIC BLOOD PRESSURE: 148 MMHG

## 2024-12-12 DIAGNOSIS — E66.01 CLASS 3 SEVERE OBESITY DUE TO EXCESS CALORIES WITH SERIOUS COMORBIDITY AND BODY MASS INDEX (BMI) OF 40.0 TO 44.9 IN ADULT (HCC): Primary | ICD-10-CM

## 2024-12-12 DIAGNOSIS — E66.813 CLASS 3 SEVERE OBESITY DUE TO EXCESS CALORIES WITH SERIOUS COMORBIDITY AND BODY MASS INDEX (BMI) OF 40.0 TO 44.9 IN ADULT (HCC): Primary | ICD-10-CM

## 2024-12-12 PROCEDURE — G2211 COMPLEX E/M VISIT ADD ON: HCPCS | Performed by: INTERNAL MEDICINE

## 2024-12-12 PROCEDURE — 99215 OFFICE O/P EST HI 40 MIN: CPT | Performed by: INTERNAL MEDICINE

## 2024-12-12 NOTE — ASSESSMENT & PLAN NOTE
Patient is doing really well on on Wegovy.  He is finished 4 weeks of the 0.25 mg and 4 weeks of the 0.5 mg and has already lost 28 pounds, congratulated patient on losing 9% of his body weight.  He is losing about 3 to 4 pounds a week.  He reports he is down 2 pant sizes.  He will titrate up to 1 mg.  1 refill will be provided.  Asked patient to check with insurance regarding coverage for repeat dose of 1 mg.  If not approved we will titrate up to 1.7 mg and subsequently to 2.4 mg.  Patient feels like his food noise has decreased.  He has stopped drinking ice tea and eating fast food.  He is ensuring that he gets about 100 250 g of protein a day.  He has met and worked with the dietitian and has been tracking using [employer].  He does not feel the temptation for sweet processed foods such as cakes.     Nutrition Prescription  Calories: 9472-6288 kcal  Protein:  g  Fluid: 2800 ml    Patient also has been starting to go to the gym and incorporating more cardio.  He states that he was waiting to get 2 to 50 pounds before initiating resistance training.  I discussed with him safely introducing strength training with slow progression 2 to 3 days a week rotating muscle groups in order to rebuild muscle.STOP- BANG- 4/8 ; 7-8hours of sleep per night    Patient has a chart diagnosis of bipolar disease under the care of psychiatrist on lithium so would like to avoid phentermine topiramate.  Would need psychiatry approval for trialing agents such as Wellbutrin and naltrexone due to potential mood changes.  Patient stopped metformin after he initiated Wegovy.  We could consider adding this on at follow-up visits for weight plateaus.

## 2024-12-17 ENCOUNTER — TELEPHONE (OUTPATIENT)
Age: 34
End: 2024-12-17

## 2024-12-17 NOTE — TELEPHONE ENCOUNTER
Patient calling in and stating that he called his insurance company and they stated he did not need a pre authorization to stat on the wegovy 1 mg

## 2024-12-26 ENCOUNTER — APPOINTMENT (OUTPATIENT)
Dept: LAB | Facility: MEDICAL CENTER | Age: 34
End: 2024-12-26
Payer: COMMERCIAL

## 2024-12-26 DIAGNOSIS — E78.2 MIXED HYPERLIPIDEMIA: ICD-10-CM

## 2024-12-26 DIAGNOSIS — E03.9 HYPOTHYROIDISM, UNSPECIFIED TYPE: ICD-10-CM

## 2024-12-26 LAB
ALBUMIN SERPL BCG-MCNC: 5 G/DL (ref 3.5–5)
ALP SERPL-CCNC: 74 U/L (ref 34–104)
ALT SERPL W P-5'-P-CCNC: 58 U/L (ref 7–52)
ANION GAP SERPL CALCULATED.3IONS-SCNC: 11 MMOL/L (ref 4–13)
AST SERPL W P-5'-P-CCNC: 29 U/L (ref 13–39)
BILIRUB SERPL-MCNC: 0.65 MG/DL (ref 0.2–1)
BUN SERPL-MCNC: 14 MG/DL (ref 5–25)
CALCIUM SERPL-MCNC: 10.1 MG/DL (ref 8.4–10.2)
CHLORIDE SERPL-SCNC: 102 MMOL/L (ref 96–108)
CHOLEST SERPL-MCNC: 113 MG/DL (ref ?–200)
CO2 SERPL-SCNC: 25 MMOL/L (ref 21–32)
CREAT SERPL-MCNC: 1.18 MG/DL (ref 0.6–1.3)
GFR SERPL CREATININE-BSD FRML MDRD: 79 ML/MIN/1.73SQ M
GLUCOSE P FAST SERPL-MCNC: 90 MG/DL (ref 65–99)
HDLC SERPL-MCNC: 28 MG/DL
LDLC SERPL CALC-MCNC: 66 MG/DL (ref 0–100)
NONHDLC SERPL-MCNC: 85 MG/DL
POTASSIUM SERPL-SCNC: 3.8 MMOL/L (ref 3.5–5.3)
PROT SERPL-MCNC: 7.6 G/DL (ref 6.4–8.4)
SODIUM SERPL-SCNC: 138 MMOL/L (ref 135–147)
TRIGL SERPL-MCNC: 96 MG/DL (ref ?–150)
TSH SERPL DL<=0.05 MIU/L-ACNC: 3.7 UIU/ML (ref 0.45–4.5)

## 2024-12-26 PROCEDURE — 84443 ASSAY THYROID STIM HORMONE: CPT

## 2024-12-26 PROCEDURE — 80061 LIPID PANEL: CPT

## 2024-12-26 PROCEDURE — 80053 COMPREHEN METABOLIC PANEL: CPT

## 2024-12-26 PROCEDURE — 36415 COLL VENOUS BLD VENIPUNCTURE: CPT

## 2024-12-30 ENCOUNTER — RESULTS FOLLOW-UP (OUTPATIENT)
Dept: FAMILY MEDICINE CLINIC | Facility: MEDICAL CENTER | Age: 34
End: 2024-12-30

## 2025-01-02 ENCOUNTER — OFFICE VISIT (OUTPATIENT)
Dept: FAMILY MEDICINE CLINIC | Facility: MEDICAL CENTER | Age: 35
End: 2025-01-02
Payer: COMMERCIAL

## 2025-01-02 VITALS
TEMPERATURE: 97.6 F | BODY MASS INDEX: 39.9 KG/M2 | OXYGEN SATURATION: 99 % | RESPIRATION RATE: 14 BRPM | DIASTOLIC BLOOD PRESSURE: 78 MMHG | WEIGHT: 285 LBS | HEART RATE: 85 BPM | HEIGHT: 71 IN | SYSTOLIC BLOOD PRESSURE: 120 MMHG

## 2025-01-02 DIAGNOSIS — E66.01 CLASS 3 SEVERE OBESITY DUE TO EXCESS CALORIES WITH SERIOUS COMORBIDITY AND BODY MASS INDEX (BMI) OF 40.0 TO 44.9 IN ADULT (HCC): ICD-10-CM

## 2025-01-02 DIAGNOSIS — F31.9 SEVERE BIPOLAR I DISORDER (HCC): ICD-10-CM

## 2025-01-02 DIAGNOSIS — Z00.00 ANNUAL PHYSICAL EXAM: Primary | ICD-10-CM

## 2025-01-02 DIAGNOSIS — R74.01 ELEVATED ALT MEASUREMENT: ICD-10-CM

## 2025-01-02 DIAGNOSIS — Z23 ENCOUNTER FOR IMMUNIZATION: ICD-10-CM

## 2025-01-02 DIAGNOSIS — E66.813 CLASS 3 SEVERE OBESITY DUE TO EXCESS CALORIES WITH SERIOUS COMORBIDITY AND BODY MASS INDEX (BMI) OF 40.0 TO 44.9 IN ADULT (HCC): ICD-10-CM

## 2025-01-02 DIAGNOSIS — Z11.59 NEED FOR HEPATITIS C SCREENING TEST: ICD-10-CM

## 2025-01-02 PROCEDURE — 99395 PREV VISIT EST AGE 18-39: CPT

## 2025-01-02 PROCEDURE — 90651 9VHPV VACCINE 2/3 DOSE IM: CPT

## 2025-01-02 PROCEDURE — 99213 OFFICE O/P EST LOW 20 MIN: CPT

## 2025-01-02 PROCEDURE — 90471 IMMUNIZATION ADMIN: CPT

## 2025-01-02 NOTE — PATIENT INSTRUCTIONS
"Patient Education     Routine physical for adults   The Basics   Written by the doctors and editors at [facility]   What is a physical? -- A physical is a routine visit, or \"check-up,\" with your doctor. You might also hear it called a \"wellness visit\" or \"preventive visit.\"  During each visit, the doctor will:   Ask about your physical and mental health   Ask about your habits, behaviors, and lifestyle   Do an exam   Give you vaccines if needed   Talk to you about any medicines you take   Give advice about your health   Answer your questions  Getting regular check-ups is an important part of taking care of your health. It can help your doctor find and treat any problems you have. But it's also important for preventing health problems.  A routine physical is different from a \"sick visit.\" A sick visit is when you see a doctor because of a health concern or problem. Since physicals are scheduled ahead of time, you can think about what you want to ask the doctor.  How often should I get a physical? -- It depends on your age and health. In general, for people age 21 years and older:   If you are younger than 50 years, you might be able to get a physical every 3 years.   If you are 50 years or older, your doctor might recommend a physical every year.  If you have an ongoing health condition, like diabetes or high blood pressure, your doctor will probably want to see you more often.  What happens during a physical? -- In general, each visit will include:   Physical exam - The doctor or nurse will check your height, weight, heart rate, and blood pressure. They will also look at your eyes and ears. They will ask about how you are feeling and whether you have any symptoms that bother you.   Medicines - It's a good idea to bring a list of all the medicines you take to each doctor visit. Your doctor will talk to you about your medicines and answer any questions. Tell them if you are having any side effects that bother you. You " "should also tell them if you are having trouble paying for any of your medicines.   Habits and behaviors - This includes:   Your diet   Your exercise habits   Whether you smoke, drink alcohol, or use drugs   Whether you are sexually active   Whether you feel safe at home  Your doctor will talk to you about things you can do to improve your health and lower your risk of health problems. They will also offer help and support. For example, if you want to quit smoking, they can give you advice and might prescribe medicines. If you want to improve your diet or get more physical activity, they can help you with this, too.   Lab tests, if needed - The tests you get will depend on your age and situation. For example, your doctor might want to check your:   Cholesterol   Blood sugar   Iron level   Vaccines - The recommended vaccines will depend on your age, health, and what vaccines you already had. Vaccines are very important because they can prevent certain serious or deadly infections.   Discussion of screening - \"Screening\" means checking for diseases or other health problems before they cause symptoms. Your doctor can recommend screening based on your age, risk, and preferences. This might include tests to check for:   Cancer, such as breast, prostate, cervical, ovarian, colorectal, prostate, lung, or skin cancer   Sexually transmitted infections, such as chlamydia and gonorrhea   Mental health conditions like depression and anxiety  Your doctor will talk to you about the different types of screening tests. They can help you decide which screenings to have. They can also explain what the results might mean.   Answering questions - The physical is a good time to ask the doctor or nurse questions about your health. If needed, they can refer you to other doctors or specialists, too.  Adults older than 65 years often need other care, too. As you get older, your doctor will talk to you about:   How to prevent falling at " home   Hearing or vision tests   Memory testing   How to take your medicines safely   Making sure that you have the help and support you need at home  All topics are updated as new evidence becomes available and our peer review process is complete.  This topic retrieved from [employer] on: May 02, 2024.  Topic 742691 Version 1.0  Release: 32.4.3 - C32.122  © 2024 UpToDate, Inc. and/or its affiliates. All rights reserved.  Consumer Information Use and Disclaimer   Disclaimer: This generalized information is a limited summary of diagnosis, treatment, and/or medication information. It is not meant to be comprehensive and should be used as a tool to help the user understand and/or assess potential diagnostic and treatment options. It does NOT include all information about conditions, treatments, medications, side effects, or risks that may apply to a specific patient. It is not intended to be medical advice or a substitute for the medical advice, diagnosis, or treatment of a health care provider based on the health care provider's examination and assessment of a patient's specific and unique circumstances. Patients must speak with a health care provider for complete information about their health, medical questions, and treatment options, including any risks or benefits regarding use of medications. This information does not endorse any treatments or medications as safe, effective, or approved for treating a specific patient. UpToDate, Inc. and its affiliates disclaim any warranty or liability relating to this information or the use thereof.The use of this information is governed by the Terms of Use, available at https://www.[employer].uwer.com/en/know/clinical-effectiveness-terms. 2024© UpToDate, Inc. and its affiliates and/or licensors. All rights reserved.  Copyright   © 2024 UpToDate, Inc. and/or its affiliates. All rights reserved.

## 2025-01-02 NOTE — PROGRESS NOTES
Adult Annual Physical  Name: Mack Cortes      : 1990      MRN: [id number]  Encounter Provider: LATONIA Al  Encounter Date: 2025   Encounter department: [facility]    Assessment & Plan  Annual physical exam  Continue with healthy diet, regular exercise and weight loss efforts.  Return in 6 months for follow-up.  Check fasting blood work in 3 months.  HPV #3 vaccine updated today.       Encounter for immunization  HPV #3 vaccine updated today to complete the series.  Orders:    HPV VACCINE 9 VALENT IM    Elevated ALT measurement  Mildly elevated ALT on most recent labs, recheck in 3 months.  Orders:    Comprehensive metabolic panel; Future    Severe bipolar I disorder (HCC)  Followed by psychiatry.  Currently on lithium.       Class 3 severe obesity due to excess calories with serious comorbidity and body mass index (BMI) of 40.0 to 44.9 in adult (HCC)  Following with weight management.  Following healthy diet, has been making changes and exercising 3 to 4 days/week.  Currently on Wegovy.       Need for hepatitis C screening test  Patient agreeable to lab work.  Orders:    Hepatitis C antibody; Future      Immunizations and preventive care screenings were discussed with patient today. Appropriate education was printed on patient's after visit summary.    Counseling:  Alcohol/drug use: discussed moderation in alcohol intake, the recommendations for healthy alcohol use, and avoidance of illicit drug use.  Dental Health: discussed importance of regular tooth brushing, flossing, and dental visits.  Exercise: the importance of regular exercise/physical activity was discussed. Recommend exercise 3-5 times per week for at least 30 minutes.          History of Present Illness     Adult Annual Physical:  Patient presents for annual physical. 34-year-old male presents for annual physical exam.  He is doing very well overall, offers no concerns or complaints at this time.  He  "is following with weight management, currently on Wegovy, doing well.  He reports following a healthy diet making changes and exercising 3 to 4 days/week.  Most recent labs reviewed, unremarkable with the exception of mildly elevated ALT, will recheck labs in 3 months.  Patient agreeable to hepatitis C screening, will do this in 3 months as well.  Patient had first 2 HPV vaccines, did not complete the series with third vaccine, agreeable to receiving this today.  .     Diet and Physical Activity:  - Diet/Nutrition: well balanced diet. making changes, following with [facility], recently started on Wegovy  - Exercise: 3-4 times a week on average. going to the gym    General Health:  - Sleep: sleeps well.  - Hearing: normal hearing bilateral ears.  - Vision: wears glasses and goes for regular eye exams.  - Dental: no dental visits for > 1 year.     Health:    - Urinary symptoms: none.     Review of Systems   Constitutional: Negative.    HENT: Negative.     Eyes: Negative.    Respiratory: Negative.     Cardiovascular: Negative.    Gastrointestinal: Negative.    Endocrine: Negative.    Genitourinary: Negative.    Musculoskeletal: Negative.    Skin: Negative.    Allergic/Immunologic: Negative.    Neurological: Negative.    Hematological: Negative.    Psychiatric/Behavioral: Negative.         Objective   /78 (BP Location: Left arm, Patient Position: Sitting, Cuff Size: Large)   Pulse 85   Temp 97.6 °F (36.4 °C) (Temporal)   Resp 14   Ht 5' 10.5\" (1.791 m)   Wt 129 kg (285 lb)   SpO2 99%   BMI 40.32 kg/m²     Physical Exam  Vitals and nursing note reviewed.   Constitutional:       General: He is not in acute distress.     Appearance: Normal appearance. He is well-developed. He is obese. He is not ill-appearing.   HENT:      Head: Normocephalic and atraumatic.      Right Ear: Tympanic membrane, ear canal and external ear normal.      Left Ear: Tympanic membrane, ear canal and external ear normal.      Nose: Nose " normal.      Mouth/Throat:      Mouth: Mucous membranes are moist.      Pharynx: Oropharynx is clear.   Eyes:      General:         Right eye: No discharge.         Left eye: No discharge.      Conjunctiva/sclera: Conjunctivae normal.   Cardiovascular:      Rate and Rhythm: Normal rate and regular rhythm.      Pulses: Normal pulses.      Heart sounds: Normal heart sounds. No murmur heard.  Pulmonary:      Effort: Pulmonary effort is normal. No respiratory distress.      Breath sounds: Normal breath sounds.   Abdominal:      General: Abdomen is flat. Bowel sounds are normal.      Palpations: Abdomen is soft.      Tenderness: There is no abdominal tenderness. There is no guarding.   Musculoskeletal:         General: No swelling. Normal range of motion.      Cervical back: Normal range of motion and neck supple.   Lymphadenopathy:      Cervical: No cervical adenopathy.   Skin:     General: Skin is warm and dry.      Capillary Refill: Capillary refill takes less than 2 seconds.   Neurological:      General: No focal deficit present.      Mental Status: He is alert and oriented to person, place, and time. Mental status is at baseline.   Psychiatric:         Mood and Affect: Mood normal.         Behavior: Behavior normal.         Thought Content: Thought content normal.

## 2025-01-02 NOTE — ASSESSMENT & PLAN NOTE
Following with weight management.  Following healthy diet, has been making changes and exercising 3 to 4 days/week.  Currently on Wegovy.

## 2025-01-28 DIAGNOSIS — M1A.9XX0 CHRONIC GOUT INVOLVING TOE OF RIGHT FOOT WITHOUT TOPHUS, UNSPECIFIED CAUSE: ICD-10-CM

## 2025-01-29 RX ORDER — ALLOPURINOL 300 MG/1
300 TABLET ORAL DAILY
Qty: 90 TABLET | Refills: 1 | Status: SHIPPED | OUTPATIENT
Start: 2025-01-29

## 2025-04-17 DIAGNOSIS — E78.2 MIXED HYPERLIPIDEMIA: ICD-10-CM

## 2025-04-18 RX ORDER — ROSUVASTATIN CALCIUM 5 MG/1
5 TABLET, COATED ORAL DAILY
Qty: 90 TABLET | Refills: 1 | Status: SHIPPED | OUTPATIENT
Start: 2025-04-18

## 2025-07-18 ENCOUNTER — APPOINTMENT (OUTPATIENT)
Dept: LAB | Facility: MEDICAL CENTER | Age: 35
End: 2025-07-18
Payer: COMMERCIAL

## 2025-07-18 ENCOUNTER — APPOINTMENT (OUTPATIENT)
Dept: LAB | Facility: MEDICAL CENTER | Age: 35
End: 2025-07-18
Attending: PREVENTIVE MEDICINE
Payer: COMMERCIAL

## 2025-07-18 DIAGNOSIS — R74.01 ELEVATED ALT MEASUREMENT: ICD-10-CM

## 2025-07-18 DIAGNOSIS — Z11.59 NEED FOR HEPATITIS C SCREENING TEST: ICD-10-CM

## 2025-07-18 LAB
ALBUMIN SERPL BCG-MCNC: 4.6 G/DL (ref 3.5–5)
ALP SERPL-CCNC: 57 U/L (ref 34–104)
ALT SERPL W P-5'-P-CCNC: 38 U/L (ref 7–52)
ANION GAP SERPL CALCULATED.3IONS-SCNC: 10 MMOL/L (ref 4–13)
AST SERPL W P-5'-P-CCNC: 22 U/L (ref 13–39)
BILIRUB SERPL-MCNC: 0.91 MG/DL (ref 0.2–1)
BUN SERPL-MCNC: 15 MG/DL (ref 5–25)
CALCIUM SERPL-MCNC: 9.7 MG/DL (ref 8.4–10.2)
CHLORIDE SERPL-SCNC: 100 MMOL/L (ref 96–108)
CO2 SERPL-SCNC: 25 MMOL/L (ref 21–32)
CREAT SERPL-MCNC: 1.03 MG/DL (ref 0.6–1.3)
GFR SERPL CREATININE-BSD FRML MDRD: 93 ML/MIN/1.73SQ M
GLUCOSE P FAST SERPL-MCNC: 96 MG/DL (ref 65–99)
HCV AB SER QL: NORMAL
POTASSIUM SERPL-SCNC: 4.1 MMOL/L (ref 3.5–5.3)
PROT SERPL-MCNC: 7.2 G/DL (ref 6.4–8.4)
SODIUM SERPL-SCNC: 135 MMOL/L (ref 135–147)

## 2025-07-18 PROCEDURE — 86803 HEPATITIS C AB TEST: CPT

## 2025-07-18 PROCEDURE — 36415 COLL VENOUS BLD VENIPUNCTURE: CPT

## 2025-07-18 PROCEDURE — 80053 COMPREHEN METABOLIC PANEL: CPT

## 2025-07-28 DIAGNOSIS — E03.9 HYPOTHYROIDISM, UNSPECIFIED TYPE: ICD-10-CM

## 2025-07-31 RX ORDER — LEVOTHYROXINE SODIUM 75 UG/1
75 TABLET ORAL DAILY
Qty: 30 TABLET | Refills: 0 | Status: SHIPPED | OUTPATIENT
Start: 2025-07-31

## 2025-08-21 ENCOUNTER — OFFICE VISIT (OUTPATIENT)
Dept: FAMILY MEDICINE CLINIC | Facility: MEDICAL CENTER | Age: 35
End: 2025-08-21
Payer: MEDICARE

## 2025-08-21 VITALS
HEIGHT: 71 IN | HEART RATE: 82 BPM | OXYGEN SATURATION: 97 % | WEIGHT: 315 LBS | RESPIRATION RATE: 18 BRPM | TEMPERATURE: 97.5 F | DIASTOLIC BLOOD PRESSURE: 70 MMHG | SYSTOLIC BLOOD PRESSURE: 130 MMHG | BODY MASS INDEX: 44.1 KG/M2

## 2025-08-21 DIAGNOSIS — E66.813 CLASS 3 SEVERE OBESITY DUE TO EXCESS CALORIES WITH SERIOUS COMORBIDITY AND BODY MASS INDEX (BMI) OF 40.0 TO 44.9 IN ADULT: ICD-10-CM

## 2025-08-21 DIAGNOSIS — F43.10 PTSD (POST-TRAUMATIC STRESS DISORDER): ICD-10-CM

## 2025-08-21 DIAGNOSIS — F41.9 ANXIETY: ICD-10-CM

## 2025-08-21 DIAGNOSIS — F31.9 BIPOLAR 1 DISORDER (HCC): ICD-10-CM

## 2025-08-21 DIAGNOSIS — M1A.9XX0 CHRONIC GOUT INVOLVING TOE OF RIGHT FOOT WITHOUT TOPHUS, UNSPECIFIED CAUSE: ICD-10-CM

## 2025-08-21 DIAGNOSIS — E06.3 HYPOTHYROIDISM DUE TO HASHIMOTO THYROIDITIS: ICD-10-CM

## 2025-08-21 DIAGNOSIS — E78.2 MIXED HYPERLIPIDEMIA: Primary | ICD-10-CM

## 2025-08-21 DIAGNOSIS — N26.1 ATROPHIC KIDNEY: ICD-10-CM

## 2025-08-21 PROCEDURE — 99214 OFFICE O/P EST MOD 30 MIN: CPT | Performed by: INTERNAL MEDICINE

## 2025-08-21 PROCEDURE — G2211 COMPLEX E/M VISIT ADD ON: HCPCS | Performed by: INTERNAL MEDICINE
